# Patient Record
Sex: MALE | Race: WHITE | Employment: UNEMPLOYED | URBAN - METROPOLITAN AREA
[De-identification: names, ages, dates, MRNs, and addresses within clinical notes are randomized per-mention and may not be internally consistent; named-entity substitution may affect disease eponyms.]

---

## 2017-06-21 ENCOUNTER — APPOINTMENT (OUTPATIENT)
Dept: CT IMAGING | Facility: HOSPITAL | Age: 47
DRG: 065 | End: 2017-06-21
Attending: EMERGENCY MEDICINE
Payer: COMMERCIAL

## 2017-06-21 ENCOUNTER — HOSPITAL ENCOUNTER (INPATIENT)
Facility: HOSPITAL | Age: 47
LOS: 2 days | Discharge: HOME | DRG: 065 | End: 2017-06-23
Attending: EMERGENCY MEDICINE | Admitting: HOSPITALIST
Payer: COMMERCIAL

## 2017-06-21 DIAGNOSIS — I63.9 ACUTE CVA (CEREBROVASCULAR ACCIDENT) (HCC): Primary | ICD-10-CM

## 2017-06-21 PROCEDURE — 70450 CT HEAD/BRAIN W/O DYE: CPT | Performed by: EMERGENCY MEDICINE

## 2017-06-21 PROCEDURE — 99223 1ST HOSP IP/OBS HIGH 75: CPT | Performed by: HOSPITALIST

## 2017-06-21 RX ORDER — FAMOTIDINE 20 MG/1
20 TABLET ORAL 2 TIMES DAILY
Status: DISCONTINUED | OUTPATIENT
Start: 2017-06-21 | End: 2017-06-23

## 2017-06-21 RX ORDER — DOCUSATE SODIUM 100 MG/1
100 CAPSULE, LIQUID FILLED ORAL 2 TIMES DAILY
Status: DISCONTINUED | OUTPATIENT
Start: 2017-06-21 | End: 2017-06-23

## 2017-06-21 RX ORDER — FAMOTIDINE 10 MG/ML
20 INJECTION, SOLUTION INTRAVENOUS 2 TIMES DAILY
Status: DISCONTINUED | OUTPATIENT
Start: 2017-06-21 | End: 2017-06-23

## 2017-06-21 RX ORDER — ASPIRIN 81 MG/1
324 TABLET, CHEWABLE ORAL ONCE
Status: COMPLETED | OUTPATIENT
Start: 2017-06-21 | End: 2017-06-21

## 2017-06-21 RX ORDER — SODIUM CHLORIDE 9 MG/ML
INJECTION, SOLUTION INTRAVENOUS CONTINUOUS
Status: ACTIVE | OUTPATIENT
Start: 2017-06-21 | End: 2017-06-22

## 2017-06-21 RX ORDER — PHENYLEPHRINE HCL IN 0.9% NACL 50MG/250ML
PLASTIC BAG, INJECTION (ML) INTRAVENOUS CONTINUOUS PRN
Status: DISCONTINUED | OUTPATIENT
Start: 2017-06-21 | End: 2017-06-23

## 2017-06-21 RX ORDER — TRAZODONE HYDROCHLORIDE 50 MG/1
50 TABLET ORAL NIGHTLY PRN
Status: DISCONTINUED | OUTPATIENT
Start: 2017-06-21 | End: 2017-06-23

## 2017-06-21 RX ORDER — POLYETHYLENE GLYCOL 3350 17 G/17G
17 POWDER, FOR SOLUTION ORAL DAILY PRN
Status: DISCONTINUED | OUTPATIENT
Start: 2017-06-21 | End: 2017-06-23

## 2017-06-21 RX ORDER — ASPIRIN 325 MG
325 TABLET ORAL DAILY
Status: DISCONTINUED | OUTPATIENT
Start: 2017-06-22 | End: 2017-06-23

## 2017-06-21 RX ORDER — LABETALOL HYDROCHLORIDE 5 MG/ML
10 INJECTION, SOLUTION INTRAVENOUS EVERY 10 MIN PRN
Status: DISCONTINUED | OUTPATIENT
Start: 2017-06-21 | End: 2017-06-23

## 2017-06-21 RX ORDER — ACETAMINOPHEN 325 MG/1
650 TABLET ORAL EVERY 4 HOURS PRN
Status: DISCONTINUED | OUTPATIENT
Start: 2017-06-21 | End: 2017-06-23

## 2017-06-21 RX ORDER — ACETAMINOPHEN 650 MG/1
650 SUPPOSITORY RECTAL EVERY 4 HOURS PRN
Status: DISCONTINUED | OUTPATIENT
Start: 2017-06-21 | End: 2017-06-23

## 2017-06-21 RX ORDER — MORPHINE SULFATE 2 MG/ML
1 INJECTION, SOLUTION INTRAMUSCULAR; INTRAVENOUS EVERY 2 HOUR PRN
Status: DISCONTINUED | OUTPATIENT
Start: 2017-06-21 | End: 2017-06-23

## 2017-06-21 RX ORDER — SODIUM PHOSPHATE, DIBASIC AND SODIUM PHOSPHATE, MONOBASIC 7; 19 G/133ML; G/133ML
1 ENEMA RECTAL ONCE AS NEEDED
Status: DISCONTINUED | OUTPATIENT
Start: 2017-06-21 | End: 2017-06-23

## 2017-06-21 RX ORDER — ONDANSETRON 2 MG/ML
4 INJECTION INTRAMUSCULAR; INTRAVENOUS EVERY 6 HOURS PRN
Status: DISCONTINUED | OUTPATIENT
Start: 2017-06-21 | End: 2017-06-23

## 2017-06-21 RX ORDER — SENNOSIDES 8.6 MG
17.2 TABLET ORAL NIGHTLY
Status: DISCONTINUED | OUTPATIENT
Start: 2017-06-21 | End: 2017-06-23

## 2017-06-21 RX ORDER — ATORVASTATIN CALCIUM 80 MG/1
80 TABLET, FILM COATED ORAL NIGHTLY
Status: DISCONTINUED | OUTPATIENT
Start: 2017-06-21 | End: 2017-06-23

## 2017-06-21 RX ORDER — BISACODYL 10 MG
10 SUPPOSITORY, RECTAL RECTAL
Status: DISCONTINUED | OUTPATIENT
Start: 2017-06-21 | End: 2017-06-23

## 2017-06-21 RX ORDER — MORPHINE SULFATE 2 MG/ML
2 INJECTION, SOLUTION INTRAMUSCULAR; INTRAVENOUS EVERY 2 HOUR PRN
Status: DISCONTINUED | OUTPATIENT
Start: 2017-06-21 | End: 2017-06-23

## 2017-06-21 RX ORDER — METOCLOPRAMIDE HYDROCHLORIDE 5 MG/ML
10 INJECTION INTRAMUSCULAR; INTRAVENOUS EVERY 8 HOURS PRN
Status: DISCONTINUED | OUTPATIENT
Start: 2017-06-21 | End: 2017-06-23

## 2017-06-21 RX ORDER — SODIUM CHLORIDE 9 MG/ML
1000 INJECTION, SOLUTION INTRAVENOUS ONCE
Status: COMPLETED | OUTPATIENT
Start: 2017-06-21 | End: 2017-06-21

## 2017-06-21 RX ORDER — ASPIRIN 300 MG
300 SUPPOSITORY, RECTAL RECTAL DAILY
Status: DISCONTINUED | OUTPATIENT
Start: 2017-06-22 | End: 2017-06-23

## 2017-06-22 ENCOUNTER — APPOINTMENT (OUTPATIENT)
Dept: MRI IMAGING | Facility: HOSPITAL | Age: 47
DRG: 065 | End: 2017-06-22
Attending: HOSPITALIST
Payer: COMMERCIAL

## 2017-06-22 ENCOUNTER — APPOINTMENT (OUTPATIENT)
Dept: CV DIAGNOSTICS | Facility: HOSPITAL | Age: 47
DRG: 065 | End: 2017-06-22
Attending: HOSPITALIST
Payer: COMMERCIAL

## 2017-06-22 PROCEDURE — 70553 MRI BRAIN STEM W/O & W/DYE: CPT | Performed by: NURSE PRACTITIONER

## 2017-06-22 PROCEDURE — 70546 MR ANGIOGRAPH HEAD W/O&W/DYE: CPT | Performed by: NURSE PRACTITIONER

## 2017-06-22 PROCEDURE — 93306 TTE W/DOPPLER COMPLETE: CPT | Performed by: HOSPITALIST

## 2017-06-22 PROCEDURE — 99232 SBSQ HOSP IP/OBS MODERATE 35: CPT | Performed by: HOSPITALIST

## 2017-06-22 PROCEDURE — 99223 1ST HOSP IP/OBS HIGH 75: CPT | Performed by: OTHER

## 2017-06-22 PROCEDURE — 70549 MR ANGIOGRAPH NECK W/O&W/DYE: CPT | Performed by: NURSE PRACTITIONER

## 2017-06-22 NOTE — SLP NOTE
Attempted to see for bedside swallow evaluation however pt currently with MD.  Will re-attempt as available and appropriate.

## 2017-06-22 NOTE — PHYSICAL THERAPY NOTE
PHYSICAL THERAPY QUICK EVALUATION - INPATIENT    Room Number: 2048/0412-P  Evaluation Date: 6/22/2017  Presenting Problem: aphasia, right facial droop, acute left frontoparietal infarct  Physician Order: PT Eval and Treat    Problem List  Principal Problem help from another person does the patient currently need. ..   -   Moving to and from a bed to a chair (including a wheelchair)?: None   -   Need to walk in hospital room?: None   -   Climbing 3-5 steps with a railing?: None       AM-PAC Score:  Raw Score: has been evaluated and presents with no skilled Physical Therapy needs at this time. Patient discharged from Physical Therapy services. Please re-order if a new functional limitation presents during this admission.     GOALS  Patient was able to achieve t

## 2017-06-22 NOTE — PROGRESS NOTES
[facility]  Progress Note     Trip Blackman Patient Status:  Inpatient    1970 MRN [id number]   [facility] 8NE-A Attending Katty [facility] Day # 1 PCP None Pcp     Chief Complaint: Aphagia    S: Patient se sodium  100 mg Oral BID   • famoTIDine  20 mg Oral BID    Or   • famoTIDine  20 mg Intravenous BID       ASSESSMENT / PLAN:     1.  Left frontparietal CVA, subacute-no tpa due to 4 days ago onset symptoms-asa/statin-MRI/MRA brain/neck; neuro consult; stroke

## 2017-06-22 NOTE — SLP NOTE
ADULT SWALLOWING EVALUATION    ASSESSMENT & PLAN   ASSESSMENT  Order received for bedside swallow evaluation.   Per chart review:    History of Present Illness: Thu Dolan is a 48year old male with aphasia.  4 days ago had onset of right facial dr Mechanical soft chopped  Diet Recommendations - Liquid:  Thin (small, single sips - straws ok)  Assist with meals as needed  Take meds one at a time with thin liquids; crush or take whole in pureed if any difficulty  Dysphagia tx  Complete video swallow angelica involvement  Comments:     GOALS  Goal #1 The patient will tolerate mechanical soft chopped consistency and thin liquids without overt signs or symptoms of aspiration with 95 % accuracy over 5 session(s).    Goal #2 The patient/family/caregiver will demonst

## 2017-06-22 NOTE — CONSULTS
[facility]  Cardiology History & Physical    Kellijennyshahana Jimenezoctavio Patient Status:  Inpatient    1970 MRN [id number]   [facility] 8NE-A Attending Katty [facility] Day # 1 PCP None Pcp     Date of Admission:  2017 atorvastatin (LIPITOR) tab 80 mg 80 mg Oral Nightly   aspirin 300 MG rectal suppository 300 mg 300 mg Rectal Daily   Or      aspirin tab 325 mg 325 mg Oral Daily   Senna (SENOKOT) tab TABS 17.2 mg 17.2 mg Oral Nightly   docusate sodium (COLACE) cap 100 m organosplenomegally, mass or rebound, BS-present. Extremities: Without clubbing, cyanosis or edema. Peripheral pulses are 2+. Motor weakness on right. Neurologic: right sided facial palsy. Motor weakness 4/5 on affected side. Skin: Warm and dry.

## 2017-06-22 NOTE — ED INITIAL ASSESSMENT (HPI)
Patient is a 49 yo  male with c/o dysarthria and facial droop over the last 4 days. Patients brother states that symptoms were worse yesterday with patient having motor deficits to L arm that have now resolved.  Patient denies any drug or alcohol u

## 2017-06-22 NOTE — H&P
[facility]  History and Physical     Liang Blackman Patient Status:  Emergency    1970 MRN [id number]   Location [facility] Attending Luis Manuel Trevino MD   Hosp Day # 0 PCP None Pcp     Chief Complaint: ap Moves all extremities. Extremities: No edema or cyanosis. Integument: No rashes or lesions. Psychiatric: Appropriate mood and affect.       Diagnostic Data:      Labs:  Recent Labs   Lab  06/21/17   1924   WBC  9.0   HGB  16.7   MCV  91.5   PLT  210.0

## 2017-06-22 NOTE — CONSULTS
[id number] [facility] Initial consultation    Benjamin Blackman Patient Status:  Inpatient    1970 MRN [id number]   [facility] 8NE-A Attending Eddi [facility] Day # 1 PCP None Pcp     REASON FOR EVALUATION: 2017.       Patient on arrival in the ED was noted to have CT head, demonstrating acute infarct in the left MCA territory. He was not a TPA or interventional candidate, as he was well outside the window for any intervention.        Otherwise, patient denie powder packet 17 g 17 g Oral Daily PRN   magnesium hydroxide (MILK OF MAGNESIA) 400 MG/5ML suspension 30 mL 30 mL Oral Daily PRN   bisacodyl (DULCOLAX) rectal suppository 10 mg 10 mg Rectal Daily PRN   FLEET ENEMA (FLEET) 7-19 GM/118ML enema 133 mL 1 enema rhythm.   ABD: Soft, non tender  NEUROLOGICAL:    NIHSS: 5  LOC 1a: 0  LOC 1b:1   LOC 1c: 0  Best Gaze: 0  Visual: 0  Facial Palsy: 2 ( severe upper and lower palsy on the right side, unable to raise eyebrow, asymmetrical smile)  Motor arm a: 0  Motor arm b PROUR Negative 06/21/2017   UROBILINOGEN <2.0 06/21/2017   NITRITE Negative 06/21/2017   LEUUR Negative 06/21/2017     HgA1C   4.8  Lipid Panel  TC   107  Trg   73  HDL   37  LDL   55      IMAGING:    MRI brain 6/22/17:     Mri Brain Mra Head+mra Neck (a includes atheroembolic versus hypercoagulable versus cardioembolic. Patient does have risk factors for stroke, as he is a former smoker for many years but given his young age, will proceed with hypercoagulable workup as well.   In addition, check TTE; if

## 2017-06-22 NOTE — OCCUPATIONAL THERAPY NOTE
OCCUPATIONAL THERAPY EVALUATION - INPATIENT     Room Number: 0511/9789-N  Evaluation Date: 6/22/2017  Type of Evaluation: Initial  Presenting Problem: Acute CVA    Physician Order: IP Consult to Occupational Therapy  Reason for Therapy: ADL/IADL Dysfunctio statement do you understand me? With a Yes and a No response written on it for he to use when anyone enters the room and he needs to communicate.     Behavioral/Emotional/Social: Pt was congenial, appropriate and amenable to evaluation    RANGE OF MOTION AN and middle fingers (digits 2 and 3).   Toileting and don of pants at S level. Bed mobility at S level.  Sit <> stand at S.  Functional ambulation for approx 150 feet  And descend/ascend 11 stairs at S level.  Gross and standardized [facility] [place] [facility] assessments c with modified independent    Functional Transfer Goals  Patient will perform all functional transfers:  independently    UE Exercise Program Goal  Patient will be independent with bilateral AROM HEP (home exercise program).     Additional Goals:  Pt will us

## 2017-06-22 NOTE — ED PROVIDER NOTES
Patient Seen in: [facility] Emergency Department    History   Patient presents with:  Stroke (neurologic)    Stated Complaint: rt facial droop and diff talking    HPI    61-year-old male comes to the emergency department for an evaluation of a four- movements are intact. Tympanic membranes and oropharynx are normal.  Oral mucosa is moist.  There is a right central seventh facial droop. Tongue deviates to the right. Neck: No bruits. No adenopathy or thyromegaly. Lungs are clear to auscultation.   H ------                     CBC W/ DIFFERENTIAL[[id number]]          Abnormal            Final result                 Please view results for these tests on the individual orders.    RAINBOW DRAW BLUE   RAINBOW DRAW GOLD   RAINBOW DRAW LAVENDER   RAINBOW diagnosis)    Disposition:  Admit    Follow-up:  No follow-up provider specified. Medications Prescribed:  There are no discharge medications for this patient.       Present on Admission           ICD-10-CM Noted POA    * (Principal)Acute CVA (cerebrovas

## 2017-06-22 NOTE — CM/SW NOTE
06/22/17 1200   CM/SW Referral Data   Referral Source Nurse   Reason for Referral Protocol order set   Specify order set Stroke   Informant Patient   Patient Info   Patient's Mental Status Alert;Oriented   Patient's [address]   Number of Juli Cummings

## 2017-06-22 NOTE — PROGRESS NOTES
[id number] [facility] Preliminary Note    Bryan Blackman Patient Status:  Inpatient    1970 MRN [id number]   [facility] 8NE-A Attending Andreas [address] Day # 1 PCP None Pcp     REASON FOR EVALUATION:  4 Da Labetalol HCl (TRANDATE) injection 10 mg 10 mg Intravenous Q10 Min PRN   niCARdipine HCl in NaCl (CARDENE) 20 mg/200 ml premix infusion 5-15 mg/hr Intravenous Continuous PRN   phenylephrine in NaCl (KURT-SYNEPHRINE) 50 mg/250 ml premix infusion SOLN 100-2 Inattention: 0    Mental status: This patient is alert and orientated to person, place, time  Speech severe dysarthria, unable to understand most words clearly  Comprehension intact -  Correct naming of items  Attention/concentration: Normal, able to follow 06/21/2017     HgA1C   4.8  Lipid Panel  TC   107  Trg   73  HDL   37  LDL   55      IMAGING:  [facility] 6/21/2017  CONCLUSION:     1.  There is a wedge-shaped focus of low density and loss of gray/white interface with sulcal effacement involving the left frontop

## 2017-06-22 NOTE — PAYOR COMM NOTE
--------------  ADMISSION REVIEW     Payor: Arnold Soliman Hesham Torresbrennon #:  [id number]  Authorization Number: N/A    Admit date: 6/21/2017  7:12 PM       Admitting Physician: Liz Leigh MD  Attending Physician:  Bethany Bernard*  Primary Care Ph Therapeutic Range is approximately 65- 104 seconds. The therapeutic range has been validated against 0.3-0.7 heparin anti-Xa units/mL. CBC WITH DIFFERENTIAL WITH PLATELET    Narrative:      The following orders were created for panel order CBC WITH DIFF 6/22/2017 0747 Given 300 mg Rectal Janet Jefferson RN      aspirin chewable tab 324 mg     Date Action Dose Route User    6/21/2017 2114 Given 324 mg Oral Noel Keita RN      famoTIDine (PEPCID) injection 20 mg     Date Action Dose Route User    6/

## 2017-06-23 ENCOUNTER — HOSPITAL ENCOUNTER (OUTPATIENT)
Dept: CV DIAGNOSTICS | Facility: HOSPITAL | Age: 47
Discharge: HOME | End: 2017-06-23
Attending: INTERNAL MEDICINE
Payer: COMMERCIAL

## 2017-06-23 ENCOUNTER — APPOINTMENT (OUTPATIENT)
Dept: CV DIAGNOSTICS | Facility: HOSPITAL | Age: 47
DRG: 065 | End: 2017-06-23
Attending: Other
Payer: COMMERCIAL

## 2017-06-23 VITALS
RESPIRATION RATE: 16 BRPM | OXYGEN SATURATION: 100 % | SYSTOLIC BLOOD PRESSURE: 121 MMHG | HEART RATE: 59 BPM | TEMPERATURE: 98 F | HEIGHT: 76 IN | BODY MASS INDEX: 23.14 KG/M2 | DIASTOLIC BLOOD PRESSURE: 63 MMHG | WEIGHT: 190 LBS

## 2017-06-23 DIAGNOSIS — I63.9 ACUTE CVA (CEREBROVASCULAR ACCIDENT) (HCC): ICD-10-CM

## 2017-06-23 PROCEDURE — 93271 ECG/MONITORING AND ANALYSIS: CPT | Performed by: INTERNAL MEDICINE

## 2017-06-23 PROCEDURE — 93270 REMOTE 30 DAY ECG REV/REPORT: CPT | Performed by: INTERNAL MEDICINE

## 2017-06-23 PROCEDURE — 99233 SBSQ HOSP IP/OBS HIGH 50: CPT | Performed by: OTHER

## 2017-06-23 PROCEDURE — 93320 DOPPLER ECHO COMPLETE: CPT | Performed by: OTHER

## 2017-06-23 PROCEDURE — 99239 HOSP IP/OBS DSCHRG MGMT >30: CPT | Performed by: HOSPITALIST

## 2017-06-23 PROCEDURE — 93325 DOPPLER ECHO COLOR FLOW MAPG: CPT | Performed by: OTHER

## 2017-06-23 PROCEDURE — 82962 GLUCOSE BLOOD TEST: CPT

## 2017-06-23 PROCEDURE — 93272 ECG/REVIEW INTERPRET ONLY: CPT | Performed by: INTERNAL MEDICINE

## 2017-06-23 PROCEDURE — B24BZZ4 ULTRASONOGRAPHY OF HEART WITH AORTA, TRANSESOPHAGEAL: ICD-10-PCS | Performed by: INTERNAL MEDICINE

## 2017-06-23 RX ORDER — ASPIRIN 325 MG
325 TABLET ORAL DAILY
Qty: 30 TABLET | Refills: 1 | Status: SHIPPED | COMMUNITY
Start: 2017-06-23

## 2017-06-23 RX ORDER — MIDAZOLAM HYDROCHLORIDE 1 MG/ML
INJECTION INTRAMUSCULAR; INTRAVENOUS
Status: COMPLETED
Start: 2017-06-23 | End: 2017-06-23

## 2017-06-23 RX ORDER — ATORVASTATIN CALCIUM 80 MG/1
80 TABLET, FILM COATED ORAL NIGHTLY
Qty: 30 TABLET | Refills: 1 | Status: SHIPPED | OUTPATIENT
Start: 2017-06-23 | End: 2017-07-03

## 2017-06-23 NOTE — SLP NOTE
SPEECH/LANGUAGE/COGNITIVE EVALUATION - INPATIENT    Admission Date: 6/21/2017  Evaluation Date: 06/23/2017    Reason for Referral: Stroke protocol    ASSESSMENT & PLAN   ASSESSMENT  Pt seen for communication screening; utilized the Travis Aphasia Battery identification of errors noted and independent corrections accepted    READING COMPREHENSION  DNT    WRITTEN EXPRESSION  Utilizing Left dominant hand, pt formulated name & address 100%; labeled 3D objects 50% and formulated simple paragraph re: reason for you have any questions please contact Geraldine Tang

## 2017-06-23 NOTE — OCCUPATIONAL THERAPY NOTE
OCCUPATIONAL THERAPY TREATMENT NOTE - INPATIENT     Room Number: 5564/4136-L  Session: 1  Number of Visits to Meet Established Goals: 7    Presenting Problem: Acute CVA    History related to current admission:     Problem List  Principal Problem:    Acute session/findings; All patient questions and concerns addressed; Discussed recommendations with /    ASSESSMENT   Patient is a 48year old male admitted 6/21/2017 for Acute CVA (cerebrovascular accident) [facility]) [I63.9]   CVA (cerebral

## 2017-06-23 NOTE — PROGRESS NOTES
06/23/17 1504   Clinical Encounter Type   Visited With Patient   Routine Visit Introduction   Continue Visiting No   Pt says he is not a person of robyn and did not need our services.

## 2017-06-23 NOTE — PROGRESS NOTES
IV and tele discontinued. Pt. Received discharge instructions and follow-up information as well as paper prescription. Coordinated with cardiographics for 30-day event monitor to be placed just after pt. Is discharged from the floor.  Questions addressed an

## 2017-06-23 NOTE — PROGRESS NOTES
Patient recovered from JUAN, vitals are stable,report given to [facility] Rn,patient was transferred to floor with transport in stable condition.

## 2017-06-23 NOTE — PROCEDURES
Cardiology Transesophageal Echo Note    PRE-PROCEDURE DIAGNOSIS: CVA - assess for cardiac source of thrombus    PROCEDURE: Transesophageal Echocardiogram.    SEDATION:   Topical spray x 1  Versed: 4 mg  Fentanyl: 125 mcg    I personally supervised the intr

## 2017-06-23 NOTE — CM/SW NOTE
Spoke with dr Mya Smith (back line [phone]) confirmed her patient--number to call texted to dr Darrius Guillen, await confirmation of transfer.   Dr Alon Peters has admitting privilages at [facility]  pcp fax  [phone]    Spoke with dr Darrius Guillen who spok

## 2017-06-23 NOTE — CM/SW NOTE
Received call from  that patient is out of network. Patient belongs to [facility]  pcp dr Zuri Roque [phone]. Dr Triston Pereira updated.   To call pcp's office as pt should be at [facility] or [facility] to be in network--message left with office to confirm--

## 2017-06-23 NOTE — PROGRESS NOTES
[facility]  Progress Note     Jovany Blackman Patient Status:  Inpatient    1970 MRN [id number]   [facility]NE-A [facility] Day [facility] 2 PCP None Pcp     Chief Complaint: Aphagia    S: Patient se 100 mg Oral BID   • famoTIDine  20 mg Oral BID    Or   • famoTIDine  20 mg Intravenous BID       ASSESSMENT / PLAN:     1.  Left frontparietal CVA, subacute-no tpa due to 4 days ago onset symptoms-asa/statin-MRI/MRA brain/neck; neuro consult; stroke protoco

## 2017-06-23 NOTE — PROGRESS NOTES
[facility] Neurology Progress Note    Lois Blackman Patient Status:  Inpatient    1970 MRN [id number]   [facility] 8NE-A Attending [facility] Day # 2 PCP None Pcp     Chief Complaint:   Follow up fo noted, no drift, slightly diminished fine motor on the right tone normal.    Coord: Finger-to-nose and Heel-knee-shin is intact, no tremor    Romberg: absent  Gait: deferred    Labs:    Lab Results  Component Value Date   PGLU 95 06/23/2017     HgA1C       above.    Plan:  1.  Ischemic Stroke order set in place   - work up completed, see above results   - TTE was unremarkable, will order JUAN   - Hyper-coag work up in progress (Antithrombin, Protein C, Protein S, Beta 2, Anticardiolipin AB, Lupus anticoagulant

## 2017-06-25 NOTE — DISCHARGE SUMMARY
[facility] HOSPITALIST  DISCHARGE SUMMARY     Aleaberta Kimarcadio Patient Status:  Inpatient    1970 MRN [id number]   [facility] 8NE-A Attending No att. providers found   Hosp Day # 2 PCP None Pcp     Date of Admission: 2017  Date of Hypercoagulable workup was started for negative PT OT recommending home with outpatient physical therapy and speech therapy. Patient neurologically stable and workup negative cardiology did recommend having a LINQ recorder placed.   Patient is out of netwo Physical Exam:    General: No acute distress. Respiratory: Clear to auscultation bilaterally. No wheezes. No rhonchi. Cardiovascular: S1, S2. Regular rate and rhythm. No murmurs, rubs or gallops. Abdomen: Soft, nontender, nondistended.   Positive b

## 2017-06-26 NOTE — PAYOR COMM NOTE
--------------  DISCHARGE REVIEW    Payor: Arnold Soliman Hesham Torresbrennon #:  [id number]  Authorization Number: [id number]    Admit date: 6/21/2017  7:12 PM  Discharge Date: 6/23/2017  3:26 PM     Admitting Physician: Mandy Hollis MD  Attending Phys

## 2017-07-03 ENCOUNTER — OFFICE VISIT (OUTPATIENT)
Dept: NEUROLOGY | Facility: CLINIC | Age: 47
End: 2017-07-03

## 2017-07-03 VITALS
SYSTOLIC BLOOD PRESSURE: 115 MMHG | DIASTOLIC BLOOD PRESSURE: 76 MMHG | RESPIRATION RATE: 16 BRPM | HEART RATE: 70 BPM | HEIGHT: 77 IN | WEIGHT: 183 LBS | BODY MASS INDEX: 21.61 KG/M2

## 2017-07-03 DIAGNOSIS — D68.52 PROTHROMBIN G20210A MUTATION (HCC): ICD-10-CM

## 2017-07-03 DIAGNOSIS — I63.512 ACUTE ISCHEMIC LEFT MIDDLE CEREBRAL ARTERY (MCA) STROKE (HCC): Primary | ICD-10-CM

## 2017-07-03 PROCEDURE — 99214 OFFICE O/P EST MOD 30 MIN: CPT | Performed by: PHYSICIAN ASSISTANT

## 2017-07-03 RX ORDER — ATORVASTATIN CALCIUM 80 MG/1
80 TABLET, FILM COATED ORAL NIGHTLY
Qty: 30 TABLET | Refills: 1 | Status: SHIPPED | OUTPATIENT
Start: 2017-07-03

## 2017-07-03 NOTE — PATIENT INSTRUCTIONS
Refill policies:    • Allow 2-3 business days for refills; controlled substances may take longer.   • Contact your pharmacy at least 5 days prior to running out of medication and have them send an electronic request or submit request through the [facility] have a procedure or additional testing performed. [facility]) will contact your insurance carrier to obtain pre-certification or prior authorization.     Unfortunately, DANNY has seen an increase in denial of payment even though the p

## 2017-07-03 NOTE — PROGRESS NOTES
HPI:    Patient ID: Gabby Rodrigez is a 48year old male. HPI     Patient is a 48year old male here today with his mother for follow-up of left MCA territory stroke.  Patient was seen on 6/22/17 for 4 days of speech difficulties with intact compre HDL Cholesterol      >45 mg/dL 37 (L)   LDL Cholesterol Calc      <130 mg/dL 55   VLDL      5 - 40 mg/dL 15   T.  CHOL/HDL RATIO      <4.97 2.89   NON HDL CHOL      <130 mg/dL 70   STACLOT LUPUS ANTICOAGULANT      Negative Negative   PATHOLOGIST INTERPRET weight 183 lb. PHYSICAL EXAM:   Physical Exam   Constitutional: He is oriented to person, place, and time. He appears well-developed and well-nourished. dysarthria   HENT:   Head: Normocephalic and atraumatic. Able to raise both eyebrows.  Unable t encounter. Meds This Visit:  Signed Prescriptions Disp Refills    atorvastatin 80 MG Oral Tab 30 tablet 1      Sig: Take 1 tablet (80 mg total) by mouth nightly.            Imaging & Referrals:  SPEECH THERAPY - EXTERNAL       OB#[id number]

## 2017-07-03 NOTE — PROGRESS NOTES
HPI:    Patient ID: Emiliano Silverio is a 48year old male. HPI     Patient is here for follow-up of stroke. He has not started speech therapy. Still has numbness if right ipointer finger and and thumb. No weakness. No vision issues. No dizziness.

## 2017-11-13 ENCOUNTER — PRIOR ORIGINAL RECORDS (OUTPATIENT)
Dept: OTHER | Age: 47
End: 2017-11-13

## 2021-05-28 ENCOUNTER — TELEPHONE (OUTPATIENT)
Dept: FAMILY MEDICINE | Age: 51
End: 2021-05-28

## 2021-06-10 ENCOUNTER — APPOINTMENT (OUTPATIENT)
Dept: FAMILY MEDICINE | Age: 51
End: 2021-06-10

## 2021-07-08 ENCOUNTER — OFFICE VISIT (OUTPATIENT)
Dept: FAMILY MEDICINE | Age: 51
End: 2021-07-08

## 2021-07-08 VITALS
TEMPERATURE: 98.8 F | BODY MASS INDEX: 25.09 KG/M2 | DIASTOLIC BLOOD PRESSURE: 70 MMHG | HEART RATE: 66 BPM | SYSTOLIC BLOOD PRESSURE: 110 MMHG | HEIGHT: 76 IN | RESPIRATION RATE: 18 BRPM | WEIGHT: 206 LBS

## 2021-07-08 DIAGNOSIS — Z12.11 SCREENING FOR COLON CANCER: ICD-10-CM

## 2021-07-08 DIAGNOSIS — Z12.5 SCREENING FOR PROSTATE CANCER: ICD-10-CM

## 2021-07-08 DIAGNOSIS — Z86.73 HISTORY OF CARDIOEMBOLIC CEREBROVASCULAR ACCIDENT (CVA): ICD-10-CM

## 2021-07-08 DIAGNOSIS — Z23 NEED FOR TDAP VACCINATION: ICD-10-CM

## 2021-07-08 DIAGNOSIS — Z00.00 MEDICARE ANNUAL WELLNESS VISIT, INITIAL: Primary | ICD-10-CM

## 2021-07-08 DIAGNOSIS — Z23 NEED FOR PROPHYLACTIC VACCINATION WITH TETANUS-DIPHTHERIA (TD): ICD-10-CM

## 2021-07-08 DIAGNOSIS — R47.01 EXPRESSIVE APHASIA: ICD-10-CM

## 2021-07-08 DIAGNOSIS — Z23 NEED FOR SHINGLES VACCINE: ICD-10-CM

## 2021-07-08 PROCEDURE — 90471 IMMUNIZATION ADMIN: CPT

## 2021-07-08 PROCEDURE — 90750 HZV VACC RECOMBINANT IM: CPT

## 2021-07-08 PROCEDURE — G0438 PPPS, INITIAL VISIT: HCPCS | Performed by: FAMILY MEDICINE

## 2021-07-08 PROCEDURE — 90472 IMMUNIZATION ADMIN EACH ADD: CPT | Performed by: FAMILY MEDICINE

## 2021-07-08 PROCEDURE — 90714 TD VACC NO PRESV 7 YRS+ IM: CPT

## 2021-07-08 PROCEDURE — 99204 OFFICE O/P NEW MOD 45 MIN: CPT | Performed by: FAMILY MEDICINE

## 2021-07-08 RX ORDER — ATORVASTATIN CALCIUM 40 MG/1
40 TABLET, FILM COATED ORAL DAILY
Qty: 90 TABLET | Refills: 3 | Status: SHIPPED | OUTPATIENT
Start: 2021-07-08 | End: 2021-08-26 | Stop reason: ALTCHOICE

## 2021-07-08 RX ORDER — ASPIRIN 81 MG/1
81 TABLET ORAL
COMMUNITY

## 2021-07-08 ASSESSMENT — PATIENT HEALTH QUESTIONNAIRE - PHQ9
SUM OF ALL RESPONSES TO PHQ9 QUESTIONS 1 AND 2: 0
SUM OF ALL RESPONSES TO PHQ9 QUESTIONS 1 AND 2: 0
1. LITTLE INTEREST OR PLEASURE IN DOING THINGS: NOT AT ALL
CLINICAL INTERPRETATION OF PHQ2 SCORE: NO FURTHER SCREENING NEEDED
CLINICAL INTERPRETATION OF PHQ9 SCORE: NO FURTHER SCREENING NEEDED
2. FEELING DOWN, DEPRESSED OR HOPELESS: NOT AT ALL

## 2021-07-08 ASSESSMENT — VISUAL ACUITY
OS_CC: 20/40
OD_CC: 20/40

## 2021-07-21 ENCOUNTER — LAB SERVICES (OUTPATIENT)
Dept: LAB | Age: 51
End: 2021-07-21

## 2021-07-21 DIAGNOSIS — Z86.73 HISTORY OF CARDIOEMBOLIC CEREBROVASCULAR ACCIDENT (CVA): ICD-10-CM

## 2021-07-21 DIAGNOSIS — Z12.5 SCREENING FOR PROSTATE CANCER: ICD-10-CM

## 2021-07-21 LAB
ALBUMIN SERPL-MCNC: 5.2 G/DL (ref 3.6–5.1)
ALP SERPL-CCNC: 59 U/L (ref 45–115)
ALT SERPL W/O P-5'-P-CCNC: 48 U/L (ref 5–49)
AST SERPL-CCNC: 73 U/L (ref 14–43)
BILIRUB SERPL-MCNC: 0.8 MG/DL (ref 0–1.3)
BUN SERPL-MCNC: 13 MG/DL (ref 6–27)
CALCIUM SERPL-MCNC: 10 MG/DL (ref 8.6–10.6)
CHLORIDE SERPL-SCNC: 106 MMOL/L (ref 96–107)
CHOLEST SERPL-MCNC: 220 MG/DL (ref 140–200)
CO2 SERPL-SCNC: 23 MMOL/L (ref 22–32)
CREAT SERPL-MCNC: 0.7 MG/DL (ref 0.6–1.6)
GFR SERPL CREATININE-BSD FRML MDRD: >60 ML/MIN/{1.73M2}
GFR SERPL CREATININE-BSD FRML MDRD: >60 ML/MIN/{1.73M2}
GLUCOSE P FAST SERPL-MCNC: 109 MG/DL (ref 60–100)
HDLC SERPL-MCNC: 85 MG/DL
LDLC SERPL CALC-MCNC: 116 MG/DL (ref 30–100)
POTASSIUM SERPL-SCNC: 4.1 MMOL/L (ref 3.5–5.3)
PROT SERPL-MCNC: 8.5 G/DL (ref 6.4–8.5)
PSA SERPL-MCNC: 0.97 NG/ML (ref 0–3)
SODIUM SERPL-SCNC: 145 MMOL/L (ref 136–146)
TRIGL SERPL-MCNC: 94 MG/DL (ref 0–200)

## 2021-07-21 PROCEDURE — 80053 COMPREHEN METABOLIC PANEL: CPT | Performed by: FAMILY MEDICINE

## 2021-07-21 PROCEDURE — 36415 COLL VENOUS BLD VENIPUNCTURE: CPT | Performed by: FAMILY MEDICINE

## 2021-07-21 PROCEDURE — 80061 LIPID PANEL: CPT | Performed by: FAMILY MEDICINE

## 2021-07-21 PROCEDURE — G0103 PSA SCREENING: HCPCS | Performed by: FAMILY MEDICINE

## 2021-07-26 ENCOUNTER — TELEPHONE (OUTPATIENT)
Dept: GASTROENTEROLOGY | Age: 51
End: 2021-07-26

## 2021-08-03 DIAGNOSIS — R73.01 ELEVATED FASTING GLUCOSE: Primary | ICD-10-CM

## 2021-08-26 ENCOUNTER — TELEPHONE (OUTPATIENT)
Dept: FAMILY MEDICINE | Age: 51
End: 2021-08-26

## 2021-08-26 RX ORDER — PRAVASTATIN SODIUM 10 MG
10 TABLET ORAL DAILY
Qty: 90 TABLET | Refills: 3 | Status: SHIPPED | OUTPATIENT
Start: 2021-08-26

## 2022-10-19 ENCOUNTER — TELEPHONE (OUTPATIENT)
Dept: OTHER | Age: 52
End: 2022-10-19

## 2022-11-29 DIAGNOSIS — Z12.11 COLON CANCER SCREENING: ICD-10-CM

## 2022-12-16 ENCOUNTER — LAB SERVICES (OUTPATIENT)
Dept: LAB | Age: 52
End: 2022-12-16

## 2022-12-16 ENCOUNTER — OFFICE VISIT (OUTPATIENT)
Dept: FAMILY MEDICINE | Age: 52
End: 2022-12-16

## 2022-12-16 VITALS
OXYGEN SATURATION: 98 % | HEIGHT: 76 IN | BODY MASS INDEX: 24.11 KG/M2 | HEART RATE: 97 BPM | DIASTOLIC BLOOD PRESSURE: 60 MMHG | SYSTOLIC BLOOD PRESSURE: 100 MMHG | WEIGHT: 198 LBS | TEMPERATURE: 98 F | RESPIRATION RATE: 20 BRPM

## 2022-12-16 DIAGNOSIS — Z12.5 SCREENING PSA (PROSTATE SPECIFIC ANTIGEN): ICD-10-CM

## 2022-12-16 DIAGNOSIS — E78.5 HYPERLIPIDEMIA LDL GOAL <70: ICD-10-CM

## 2022-12-16 DIAGNOSIS — R73.01 ELEVATED FASTING GLUCOSE: ICD-10-CM

## 2022-12-16 DIAGNOSIS — Z00.00 MEDICARE ANNUAL WELLNESS VISIT, SUBSEQUENT: Primary | ICD-10-CM

## 2022-12-16 DIAGNOSIS — Z86.73 HISTORY OF CARDIOEMBOLIC CEREBROVASCULAR ACCIDENT (CVA): ICD-10-CM

## 2022-12-16 DIAGNOSIS — Z23 NEED FOR SHINGLES VACCINE: ICD-10-CM

## 2022-12-16 DIAGNOSIS — Z23 NEED FOR INFLUENZA VACCINATION: ICD-10-CM

## 2022-12-16 DIAGNOSIS — R47.01 EXPRESSIVE APHASIA: ICD-10-CM

## 2022-12-16 PROCEDURE — 99214 OFFICE O/P EST MOD 30 MIN: CPT | Performed by: FAMILY MEDICINE

## 2022-12-16 PROCEDURE — 90686 IIV4 VACC NO PRSV 0.5 ML IM: CPT | Performed by: FAMILY MEDICINE

## 2022-12-16 PROCEDURE — 80053 COMPREHEN METABOLIC PANEL: CPT | Performed by: INTERNAL MEDICINE

## 2022-12-16 PROCEDURE — 36415 COLL VENOUS BLD VENIPUNCTURE: CPT | Performed by: FAMILY MEDICINE

## 2022-12-16 PROCEDURE — 90750 HZV VACC RECOMBINANT IM: CPT | Performed by: FAMILY MEDICINE

## 2022-12-16 PROCEDURE — G0008 ADMIN INFLUENZA VIRUS VAC: HCPCS | Performed by: FAMILY MEDICINE

## 2022-12-16 PROCEDURE — G0439 PPPS, SUBSEQ VISIT: HCPCS | Performed by: FAMILY MEDICINE

## 2022-12-16 PROCEDURE — G0103 PSA SCREENING: HCPCS | Performed by: INTERNAL MEDICINE

## 2022-12-16 PROCEDURE — 90472 IMMUNIZATION ADMIN EACH ADD: CPT | Performed by: FAMILY MEDICINE

## 2022-12-16 PROCEDURE — 83036 HEMOGLOBIN GLYCOSYLATED A1C: CPT | Performed by: INTERNAL MEDICINE

## 2022-12-16 PROCEDURE — 80061 LIPID PANEL: CPT | Performed by: INTERNAL MEDICINE

## 2022-12-16 ASSESSMENT — PATIENT HEALTH QUESTIONNAIRE - PHQ9
1. LITTLE INTEREST OR PLEASURE IN DOING THINGS: NOT AT ALL
SUM OF ALL RESPONSES TO PHQ9 QUESTIONS 1 AND 2: 0
SUM OF ALL RESPONSES TO PHQ9 QUESTIONS 1 AND 2: 0
2. FEELING DOWN, DEPRESSED OR HOPELESS: NOT AT ALL
CLINICAL INTERPRETATION OF PHQ2 SCORE: NO FURTHER SCREENING NEEDED

## 2022-12-17 LAB
ALBUMIN SERPL-MCNC: 4 G/DL (ref 3.6–5.1)
ALBUMIN/GLOB SERPL: 1.1 {RATIO} (ref 1–2.4)
ALP SERPL-CCNC: 46 UNITS/L (ref 45–117)
ALT SERPL-CCNC: 44 UNITS/L
ANION GAP SERPL CALC-SCNC: 17 MMOL/L (ref 7–19)
AST SERPL-CCNC: 62 UNITS/L
BILIRUB SERPL-MCNC: 1 MG/DL (ref 0.2–1)
BUN SERPL-MCNC: 11 MG/DL (ref 6–20)
BUN/CREAT SERPL: 15 (ref 7–25)
CALCIUM SERPL-MCNC: 8.6 MG/DL (ref 8.4–10.2)
CHLORIDE SERPL-SCNC: 103 MMOL/L (ref 97–110)
CHOLEST SERPL-MCNC: 178 MG/DL
CHOLEST/HDLC SERPL: 2.3 {RATIO}
CO2 SERPL-SCNC: 26 MMOL/L (ref 21–32)
CREAT SERPL-MCNC: 0.73 MG/DL (ref 0.67–1.17)
FASTING DURATION TIME PATIENT: ABNORMAL H
FASTING DURATION TIME PATIENT: NORMAL H
GFR SERPLBLD BASED ON 1.73 SQ M-ARVRAT: >90 ML/MIN
GLOBULIN SER-MCNC: 3.6 G/DL (ref 2–4)
GLUCOSE SERPL-MCNC: 76 MG/DL (ref 70–99)
HBA1C MFR BLD: 4.8 % (ref 4.5–5.6)
HDLC SERPL-MCNC: 78 MG/DL
LDLC SERPL CALC-MCNC: 87 MG/DL
NONHDLC SERPL-MCNC: 100 MG/DL
POTASSIUM SERPL-SCNC: 4.3 MMOL/L (ref 3.4–5.1)
PROT SERPL-MCNC: 7.6 G/DL (ref 6.4–8.2)
PSA SERPL-MCNC: 0.97 NG/ML
SODIUM SERPL-SCNC: 142 MMOL/L (ref 135–145)
TRIGL SERPL-MCNC: 63 MG/DL

## 2023-01-09 ENCOUNTER — LAB SERVICES (OUTPATIENT)
Dept: LAB | Age: 53
End: 2023-01-09

## 2023-01-09 DIAGNOSIS — Z12.11 COLON CANCER SCREENING: Primary | ICD-10-CM

## 2023-01-09 PROCEDURE — 82274 ASSAY TEST FOR BLOOD FECAL: CPT | Performed by: INTERNAL MEDICINE

## 2023-01-19 LAB — HEMOCCULT STL QL: NEGATIVE

## 2023-11-18 ENCOUNTER — E-ADVICE (OUTPATIENT)
Dept: OTHER | Age: 53
End: 2023-11-18

## 2023-12-18 ENCOUNTER — APPOINTMENT (OUTPATIENT)
Dept: FAMILY MEDICINE | Age: 53
End: 2023-12-18

## 2023-12-18 ENCOUNTER — LAB SERVICES (OUTPATIENT)
Dept: LAB | Age: 53
End: 2023-12-18

## 2023-12-18 VITALS
SYSTOLIC BLOOD PRESSURE: 124 MMHG | HEART RATE: 79 BPM | BODY MASS INDEX: 23.87 KG/M2 | WEIGHT: 196 LBS | DIASTOLIC BLOOD PRESSURE: 78 MMHG | OXYGEN SATURATION: 98 % | TEMPERATURE: 97.3 F | HEIGHT: 76 IN

## 2023-12-18 DIAGNOSIS — Z13.31 DEPRESSION SCREENING: ICD-10-CM

## 2023-12-18 DIAGNOSIS — Z00.00 MEDICARE ANNUAL WELLNESS VISIT, SUBSEQUENT: ICD-10-CM

## 2023-12-18 DIAGNOSIS — E78.5 HYPERLIPIDEMIA LDL GOAL <70: ICD-10-CM

## 2023-12-18 DIAGNOSIS — R47.01 EXPRESSIVE APHASIA: Primary | ICD-10-CM

## 2023-12-18 DIAGNOSIS — Z86.73 HISTORY OF CARDIOEMBOLIC CEREBROVASCULAR ACCIDENT (CVA): ICD-10-CM

## 2023-12-18 DIAGNOSIS — Z00.00 PREVENTATIVE HEALTH CARE: ICD-10-CM

## 2023-12-18 DIAGNOSIS — H00.011 HORDEOLUM EXTERNUM OF RIGHT UPPER EYELID: ICD-10-CM

## 2023-12-18 DIAGNOSIS — Z12.11 COLON CANCER SCREENING: ICD-10-CM

## 2023-12-18 DIAGNOSIS — R73.01 ELEVATED FASTING GLUCOSE: ICD-10-CM

## 2023-12-18 PROBLEM — I69.359 HISTORY OF HEMORRHAGIC STROKE WITH RESIDUAL HEMIPLEGIA (CMD): Status: RESOLVED | Noted: 2023-12-18 | Resolved: 2023-12-18

## 2023-12-18 PROBLEM — I69.359 HISTORY OF HEMORRHAGIC STROKE WITH RESIDUAL HEMIPLEGIA (CMD): Status: ACTIVE | Noted: 2023-12-18

## 2023-12-18 LAB
ALBUMIN SERPL-MCNC: 4 G/DL (ref 3.6–5.1)
ALBUMIN/GLOB SERPL: 1.1 {RATIO} (ref 1–2.4)
ALP SERPL-CCNC: 52 UNITS/L (ref 45–117)
ALT SERPL-CCNC: 24 UNITS/L
ANION GAP SERPL CALC-SCNC: 16 MMOL/L (ref 7–19)
AST SERPL-CCNC: 23 UNITS/L
BASOPHILS # BLD: 0 K/MCL (ref 0–0.3)
BASOPHILS NFR BLD: 1 %
BILIRUB SERPL-MCNC: 0.8 MG/DL (ref 0.2–1)
BUN SERPL-MCNC: 12 MG/DL (ref 6–20)
BUN/CREAT SERPL: 16 (ref 7–25)
CALCIUM SERPL-MCNC: 8.5 MG/DL (ref 8.4–10.2)
CHLORIDE SERPL-SCNC: 103 MMOL/L (ref 97–110)
CHOLEST SERPL-MCNC: 205 MG/DL
CHOLEST/HDLC SERPL: 2.3 {RATIO}
CO2 SERPL-SCNC: 28 MMOL/L (ref 21–32)
CREAT SERPL-MCNC: 0.73 MG/DL (ref 0.67–1.17)
DEPRECATED RDW RBC: 50.3 FL (ref 39–50)
EGFRCR SERPLBLD CKD-EPI 2021: >90 ML/MIN/{1.73_M2}
EOSINOPHIL # BLD: 0.1 K/MCL (ref 0–0.5)
EOSINOPHIL NFR BLD: 1 %
ERYTHROCYTE [DISTWIDTH] IN BLOOD: 13.3 % (ref 11–15)
FASTING DURATION TIME PATIENT: NORMAL H
GLOBULIN SER-MCNC: 3.8 G/DL (ref 2–4)
GLUCOSE SERPL-MCNC: 97 MG/DL (ref 70–99)
HBA1C MFR BLD: 4.4 % (ref 4.5–5.6)
HCT VFR BLD CALC: 45.2 % (ref 39–51)
HDLC SERPL-MCNC: 88 MG/DL
HGB BLD-MCNC: 14.9 G/DL (ref 13–17)
IMM GRANULOCYTES # BLD AUTO: 0.1 K/MCL (ref 0–0.2)
IMM GRANULOCYTES # BLD: 1 %
LDLC SERPL CALC-MCNC: 109 MG/DL
LYMPHOCYTES # BLD: 1.2 K/MCL (ref 1–4)
LYMPHOCYTES NFR BLD: 17 %
MCH RBC QN AUTO: 33.6 PG (ref 26–34)
MCHC RBC AUTO-ENTMCNC: 33 G/DL (ref 32–36.5)
MCV RBC AUTO: 102 FL (ref 78–100)
MONOCYTES # BLD: 0.9 K/MCL (ref 0.3–0.9)
MONOCYTES NFR BLD: 12 %
NEUTROPHILS # BLD: 5 K/MCL (ref 1.8–7.7)
NEUTROPHILS NFR BLD: 68 %
NONHDLC SERPL-MCNC: 117 MG/DL
NRBC BLD MANUAL-RTO: 0 /100 WBC
PLATELET # BLD AUTO: 171 K/MCL (ref 140–450)
POTASSIUM SERPL-SCNC: 4.3 MMOL/L (ref 3.4–5.1)
PROT SERPL-MCNC: 7.8 G/DL (ref 6.4–8.2)
RBC # BLD: 4.43 MIL/MCL (ref 4.5–5.9)
SODIUM SERPL-SCNC: 143 MMOL/L (ref 135–145)
TRIGL SERPL-MCNC: 38 MG/DL
WBC # BLD: 7.3 K/MCL (ref 4.2–11)

## 2023-12-18 PROCEDURE — 83036 HEMOGLOBIN GLYCOSYLATED A1C: CPT | Performed by: INTERNAL MEDICINE

## 2023-12-18 PROCEDURE — 80061 LIPID PANEL: CPT | Performed by: INTERNAL MEDICINE

## 2023-12-18 PROCEDURE — 99213 OFFICE O/P EST LOW 20 MIN: CPT | Performed by: NURSE PRACTITIONER

## 2023-12-18 PROCEDURE — 85025 COMPLETE CBC W/AUTO DIFF WBC: CPT | Performed by: INTERNAL MEDICINE

## 2023-12-18 PROCEDURE — G0439 PPPS, SUBSEQ VISIT: HCPCS | Performed by: NURSE PRACTITIONER

## 2023-12-18 PROCEDURE — 80053 COMPREHEN METABOLIC PANEL: CPT | Performed by: INTERNAL MEDICINE

## 2023-12-18 PROCEDURE — G0103 PSA SCREENING: HCPCS | Performed by: CLINICAL MEDICAL LABORATORY

## 2023-12-18 PROCEDURE — 36415 COLL VENOUS BLD VENIPUNCTURE: CPT | Performed by: NURSE PRACTITIONER

## 2023-12-18 RX ORDER — ERYTHROMYCIN 5 MG/G
0.5 OINTMENT OPHTHALMIC 4 TIMES DAILY
Qty: 3.5 G | Refills: 0 | Status: SHIPPED | OUTPATIENT
Start: 2023-12-18 | End: 2023-12-25

## 2023-12-18 RX ORDER — ATORVASTATIN CALCIUM 10 MG/1
TABLET, FILM COATED ORAL
Qty: 90 TABLET | Refills: 3 | Status: SHIPPED | OUTPATIENT
Start: 2023-12-18

## 2023-12-18 ASSESSMENT — ENCOUNTER SYMPTOMS
SINUS PAIN: 0
CHEST TIGHTNESS: 0
HEADACHES: 0
PHOTOPHOBIA: 0
BACK PAIN: 0
VOMITING: 0
SHORTNESS OF BREATH: 0
FEVER: 0
APPETITE CHANGE: 0
NUMBNESS: 0
COUGH: 0
FACIAL SWELLING: 0
BLOOD IN STOOL: 0
DIZZINESS: 0
RHINORRHEA: 0
ADENOPATHY: 0
NERVOUS/ANXIOUS: 0
ABDOMINAL PAIN: 0
SINUS PRESSURE: 0
UNEXPECTED WEIGHT CHANGE: 0
EYE DISCHARGE: 0
EYE PAIN: 0
ACTIVITY CHANGE: 0
SORE THROAT: 0
FATIGUE: 0
WHEEZING: 0
BRUISES/BLEEDS EASILY: 0
SLEEP DISTURBANCE: 0
EYE ITCHING: 0
EYE REDNESS: 0
WEAKNESS: 0
CONSTIPATION: 0
DIARRHEA: 0

## 2023-12-18 ASSESSMENT — MINI COG
PATIENT WAS GIVEN REPEAT BACK WORDS FROM VERSION: 1 - BANANA SUNRISE CHAIR
PATIENT ABLE TO FILL IN THE CLOCK FACE WITH 10 MINUTES PAST 11 O'CLOCK?: YES, CLOCK IS CORRECT
PATIENT ABLE TO REPEAT THE 3 WORDS GIVEN PREVIOUSLY?: WAS ABLE TO REPEAT BACK 3 WORDS CORRECTLY
TOTAL SCORE: 5

## 2023-12-18 ASSESSMENT — PATIENT HEALTH QUESTIONNAIRE - PHQ9
1. LITTLE INTEREST OR PLEASURE IN DOING THINGS: NOT AT ALL
SUM OF ALL RESPONSES TO PHQ9 QUESTIONS 1 AND 2: 0
SUM OF ALL RESPONSES TO PHQ9 QUESTIONS 1 AND 2: 0
IS THE PATIENT ABLE TO COGNITIVELY COMPLETE THE PHQ9: NO
2. FEELING DOWN, DEPRESSED OR HOPELESS: NOT AT ALL
CLINICAL INTERPRETATION OF PHQ2 SCORE: NO FURTHER SCREENING NEEDED

## 2023-12-19 ENCOUNTER — TELEPHONE (OUTPATIENT)
Dept: FAMILY MEDICINE | Age: 53
End: 2023-12-19

## 2023-12-19 DIAGNOSIS — R97.20 ELEVATED PSA: Primary | ICD-10-CM

## 2023-12-19 LAB — PSA SERPL-MCNC: 5.49 NG/ML

## 2023-12-20 DIAGNOSIS — R97.20 ELEVATED PSA: Primary | ICD-10-CM

## 2023-12-29 ENCOUNTER — APPOINTMENT (OUTPATIENT)
Dept: OPHTHALMOLOGY | Age: 53
End: 2023-12-29

## 2023-12-29 DIAGNOSIS — Z98.890 HX OF LASIK: ICD-10-CM

## 2023-12-29 DIAGNOSIS — E11.9 TYPE 2 DIABETES MELLITUS WITHOUT RETINOPATHY (CMD): Primary | ICD-10-CM

## 2023-12-29 PROCEDURE — 92004 COMPRE OPH EXAM NEW PT 1/>: CPT | Performed by: OPHTHALMOLOGY

## 2023-12-29 ASSESSMENT — VISUAL ACUITY
OS_SC: 20/25
OS_SC: J3
OD_SC: 20/40
OD_SC+: +2
OD_PH_SC: 20/20
OD_SC: J3

## 2023-12-29 ASSESSMENT — TONOMETRY
IOP_METHOD: APPLANATION
OS_IOP_MMHG: 14
OD_IOP_MMHG: 16

## 2023-12-29 ASSESSMENT — SLIT LAMP EXAM - LIDS
COMMENTS: NORMAL
COMMENTS: NORMAL

## 2023-12-29 ASSESSMENT — EXTERNAL EXAM - RIGHT EYE: OD_EXAM: NORMAL

## 2023-12-29 ASSESSMENT — EXTERNAL EXAM - LEFT EYE: OS_EXAM: NORMAL

## 2023-12-29 ASSESSMENT — CONF VISUAL FIELD
OS_INFERIOR_NASAL_RESTRICTION: 0
OS_SUPERIOR_NASAL_RESTRICTION: 0
OD_INFERIOR_TEMPORAL_RESTRICTION: 0
OD_NORMAL: 1
OD_SUPERIOR_NASAL_RESTRICTION: 0
OD_INFERIOR_NASAL_RESTRICTION: 0
OS_INFERIOR_TEMPORAL_RESTRICTION: 0
OS_NORMAL: 1
OD_SUPERIOR_TEMPORAL_RESTRICTION: 0
OS_SUPERIOR_TEMPORAL_RESTRICTION: 0

## 2024-01-05 ENCOUNTER — TELEPHONE (OUTPATIENT)
Dept: FAMILY MEDICINE | Age: 54
End: 2024-01-05

## 2024-01-06 ENCOUNTER — E-ADVICE (OUTPATIENT)
Dept: FAMILY MEDICINE | Age: 54
End: 2024-01-06

## 2024-12-21 ENCOUNTER — OFF PREMISE (OUTPATIENT)
Dept: HEALTH INFORMATION MANAGEMENT | Facility: OTHER | Age: 54
End: 2024-12-21

## 2024-12-22 ENCOUNTER — OFF PREMISE (OUTPATIENT)
Dept: HEALTH INFORMATION MANAGEMENT | Facility: OTHER | Age: 54
End: 2024-12-22

## 2025-01-07 ENCOUNTER — OFF PREMISE (OUTPATIENT)
Dept: HEALTH INFORMATION MANAGEMENT | Facility: OTHER | Age: 55
End: 2025-01-07

## 2025-01-08 ENCOUNTER — ADMINISTRATIVE DOCUMENTATION (OUTPATIENT)
Dept: POST ACUTE CARE | Age: 55
End: 2025-01-08

## 2025-01-08 ENCOUNTER — TELEPHONE (OUTPATIENT)
Dept: FAMILY MEDICINE | Age: 55
End: 2025-01-08

## 2025-01-08 RX ORDER — OMEPRAZOLE 40 MG/1
40 CAPSULE, DELAYED RELEASE ORAL DAILY
COMMUNITY

## 2025-01-08 RX ORDER — FOLIC ACID 1 MG/1
1 TABLET ORAL DAILY
COMMUNITY

## 2025-01-08 RX ORDER — NITROGLYCERIN 0.4 MG/1
0.4 TABLET SUBLINGUAL EVERY 5 MIN PRN
COMMUNITY

## 2025-01-08 RX ORDER — CLOPIDOGREL BISULFATE 75 MG/1
75 TABLET ORAL DAILY
COMMUNITY

## 2025-01-08 RX ORDER — ENOXAPARIN SODIUM 100 MG/ML
40 INJECTION SUBCUTANEOUS AT BEDTIME
COMMUNITY

## 2025-01-08 RX ORDER — LISINOPRIL 5 MG/1
5 TABLET ORAL DAILY
COMMUNITY

## 2025-01-08 RX ORDER — AMLODIPINE BESYLATE 5 MG/1
5 TABLET ORAL DAILY
COMMUNITY

## 2025-01-08 RX ORDER — TIZANIDINE 2 MG/1
2 TABLET ORAL EVERY 8 HOURS PRN
COMMUNITY

## 2025-01-08 RX ORDER — SCOLOPAMINE TRANSDERMAL SYSTEM 1 MG/1
1 PATCH, EXTENDED RELEASE TRANSDERMAL
COMMUNITY

## 2025-01-09 ENCOUNTER — NURSING HOME VISIT (OUTPATIENT)
Dept: POST ACUTE CARE | Age: 55
End: 2025-01-09

## 2025-01-09 VITALS
DIASTOLIC BLOOD PRESSURE: 80 MMHG | HEART RATE: 99 BPM | TEMPERATURE: 97.2 F | SYSTOLIC BLOOD PRESSURE: 130 MMHG | RESPIRATION RATE: 18 BRPM | OXYGEN SATURATION: 96 %

## 2025-01-09 DIAGNOSIS — U07.1 COVID-19: ICD-10-CM

## 2025-01-09 DIAGNOSIS — I69.391 DYSPHAGIA AS LATE EFFECT OF STROKE: ICD-10-CM

## 2025-01-09 DIAGNOSIS — I10 BENIGN ESSENTIAL HTN: ICD-10-CM

## 2025-01-09 DIAGNOSIS — G81.94 LEFT HEMIPARESIS  (CMD): ICD-10-CM

## 2025-01-09 DIAGNOSIS — K21.9 GASTROESOPHAGEAL REFLUX DISEASE WITHOUT ESOPHAGITIS: ICD-10-CM

## 2025-01-09 DIAGNOSIS — R47.01 EXPRESSIVE APHASIA: ICD-10-CM

## 2025-01-09 DIAGNOSIS — Z71.89 ACP (ADVANCE CARE PLANNING): ICD-10-CM

## 2025-01-09 DIAGNOSIS — I63.512 LEFT ACUTE ARTERIAL ISCHEMIC STROKE, MCA (MIDDLE CEREBRAL ARTERY)  (CMD): Primary | ICD-10-CM

## 2025-01-09 DIAGNOSIS — R53.81 DEBILITY: ICD-10-CM

## 2025-01-09 DIAGNOSIS — D68.52 PROTHROMBIN GENE MUTATION  (CMD): ICD-10-CM

## 2025-01-09 PROBLEM — I63.319: Status: ACTIVE | Noted: 2025-01-09

## 2025-01-09 ASSESSMENT — ENCOUNTER SYMPTOMS
SINUS PRESSURE: 0
ROS GI COMMENTS: G TUBE
POLYDIPSIA: 0
SINUS PAIN: 0
HEADACHES: 0
BACK PAIN: 0
FEVER: 0
POLYPHAGIA: 0
AGITATION: 0
ACTIVITY CHANGE: 1
SORE THROAT: 0
CONSTIPATION: 0
COLOR CHANGE: 0
SHORTNESS OF BREATH: 0
COUGH: 0
NAUSEA: 0
PHOTOPHOBIA: 0
WEAKNESS: 1
WHEEZING: 0
VOMITING: 0
LIGHT-HEADEDNESS: 0
CHILLS: 0
NERVOUS/ANXIOUS: 0
RHINORRHEA: 0
EYE REDNESS: 0
DIARRHEA: 0
DIZZINESS: 0
TROUBLE SWALLOWING: 1
SLEEP DISTURBANCE: 0

## 2025-01-10 ENCOUNTER — NURSING HOME VISIT (OUTPATIENT)
Dept: POST ACUTE CARE | Age: 55
End: 2025-01-10

## 2025-01-10 VITALS
HEIGHT: 76 IN | OXYGEN SATURATION: 95 % | TEMPERATURE: 98.4 F | RESPIRATION RATE: 18 BRPM | WEIGHT: 203 LBS | SYSTOLIC BLOOD PRESSURE: 120 MMHG | BODY MASS INDEX: 24.72 KG/M2 | HEART RATE: 112 BPM | DIASTOLIC BLOOD PRESSURE: 80 MMHG

## 2025-01-10 DIAGNOSIS — G81.94 LEFT HEMIPARESIS  (CMD): ICD-10-CM

## 2025-01-10 DIAGNOSIS — K21.9 GASTROESOPHAGEAL REFLUX DISEASE WITHOUT ESOPHAGITIS: ICD-10-CM

## 2025-01-10 DIAGNOSIS — R53.81 DEBILITY: Primary | ICD-10-CM

## 2025-01-10 DIAGNOSIS — R68.89 EXCESSIVE ORAL SECRETIONS: ICD-10-CM

## 2025-01-10 DIAGNOSIS — Z86.73 HISTORY OF CARDIOEMBOLIC CEREBROVASCULAR ACCIDENT (CVA): ICD-10-CM

## 2025-01-10 DIAGNOSIS — I63.512 LEFT ACUTE ARTERIAL ISCHEMIC STROKE, MCA (MIDDLE CEREBRAL ARTERY)  (CMD): ICD-10-CM

## 2025-01-10 DIAGNOSIS — R47.01 EXPRESSIVE APHASIA: ICD-10-CM

## 2025-01-10 DIAGNOSIS — U07.1 COVID-19: ICD-10-CM

## 2025-01-10 DIAGNOSIS — I69.391 DYSPHAGIA AS LATE EFFECT OF STROKE: ICD-10-CM

## 2025-01-10 DIAGNOSIS — E78.5 HYPERLIPIDEMIA LDL GOAL <70: ICD-10-CM

## 2025-01-10 RX ORDER — IPRATROPIUM BROMIDE AND ALBUTEROL SULFATE 2.5; .5 MG/3ML; MG/3ML
3 SOLUTION RESPIRATORY (INHALATION) EVERY 8 HOURS
COMMUNITY
End: 2025-01-13

## 2025-01-10 ASSESSMENT — PAIN SCALES - GENERAL: PAINLEVEL: 0

## 2025-01-13 ENCOUNTER — NURSING HOME VISIT (OUTPATIENT)
Dept: POST ACUTE CARE | Age: 55
End: 2025-01-13

## 2025-01-13 VITALS
WEIGHT: 203.5 LBS | SYSTOLIC BLOOD PRESSURE: 140 MMHG | OXYGEN SATURATION: 96 % | HEIGHT: 76 IN | DIASTOLIC BLOOD PRESSURE: 93 MMHG | TEMPERATURE: 98.4 F | RESPIRATION RATE: 17 BRPM | HEART RATE: 99 BPM | BODY MASS INDEX: 24.78 KG/M2

## 2025-01-13 DIAGNOSIS — I69.391 DYSPHAGIA AS LATE EFFECT OF STROKE: ICD-10-CM

## 2025-01-13 DIAGNOSIS — R53.81 DEBILITY: Primary | ICD-10-CM

## 2025-01-13 DIAGNOSIS — U07.1 COVID-19: ICD-10-CM

## 2025-01-13 DIAGNOSIS — I63.512 LEFT ACUTE ARTERIAL ISCHEMIC STROKE, MCA (MIDDLE CEREBRAL ARTERY)  (CMD): ICD-10-CM

## 2025-01-13 DIAGNOSIS — I10 HYPERTENSION, UNSPECIFIED TYPE: ICD-10-CM

## 2025-01-13 DIAGNOSIS — R68.89 EXCESSIVE ORAL SECRETIONS: ICD-10-CM

## 2025-01-13 DIAGNOSIS — G81.94 LEFT HEMIPARESIS  (CMD): ICD-10-CM

## 2025-01-13 DIAGNOSIS — E78.5 HYPERLIPIDEMIA LDL GOAL <70: ICD-10-CM

## 2025-01-13 DIAGNOSIS — R47.01 EXPRESSIVE APHASIA: ICD-10-CM

## 2025-01-13 DIAGNOSIS — Z93.1 GASTROSTOMY TUBE IN PLACE  (CMD): ICD-10-CM

## 2025-01-13 DIAGNOSIS — K21.9 GASTROESOPHAGEAL REFLUX DISEASE WITHOUT ESOPHAGITIS: ICD-10-CM

## 2025-01-13 DIAGNOSIS — K59.00 CONSTIPATION, UNSPECIFIED CONSTIPATION TYPE: ICD-10-CM

## 2025-01-13 DIAGNOSIS — Z86.73 HISTORY OF CARDIOEMBOLIC CEREBROVASCULAR ACCIDENT (CVA): ICD-10-CM

## 2025-01-13 ASSESSMENT — PAIN SCALES - GENERAL: PAINLEVEL: 0

## 2025-01-13 ASSESSMENT — ENCOUNTER SYMPTOMS
ACTIVITY CHANGE: 1
CONSTIPATION: 1

## 2025-01-14 ENCOUNTER — NURSING HOME VISIT (OUTPATIENT)
Dept: POST ACUTE CARE | Age: 55
End: 2025-01-14

## 2025-01-14 VITALS
OXYGEN SATURATION: 99 % | SYSTOLIC BLOOD PRESSURE: 119 MMHG | HEART RATE: 105 BPM | TEMPERATURE: 98.4 F | RESPIRATION RATE: 20 BRPM | DIASTOLIC BLOOD PRESSURE: 75 MMHG

## 2025-01-14 DIAGNOSIS — Z93.1 GASTROSTOMY TUBE IN PLACE  (CMD): ICD-10-CM

## 2025-01-14 DIAGNOSIS — Z86.73 HISTORY OF CARDIOEMBOLIC CEREBROVASCULAR ACCIDENT (CVA): ICD-10-CM

## 2025-01-14 DIAGNOSIS — K21.9 GASTROESOPHAGEAL REFLUX DISEASE WITHOUT ESOPHAGITIS: ICD-10-CM

## 2025-01-14 DIAGNOSIS — I63.512 LEFT ACUTE ARTERIAL ISCHEMIC STROKE, MCA (MIDDLE CEREBRAL ARTERY)  (CMD): Primary | ICD-10-CM

## 2025-01-14 DIAGNOSIS — G81.94 LEFT HEMIPARESIS  (CMD): ICD-10-CM

## 2025-01-14 DIAGNOSIS — D68.52 PROTHROMBIN GENE MUTATION  (CMD): ICD-10-CM

## 2025-01-14 DIAGNOSIS — R53.81 DEBILITY: ICD-10-CM

## 2025-01-14 DIAGNOSIS — I10 HYPERTENSION, UNSPECIFIED TYPE: ICD-10-CM

## 2025-01-14 DIAGNOSIS — I69.391 DYSPHAGIA AS LATE EFFECT OF STROKE: ICD-10-CM

## 2025-01-14 DIAGNOSIS — R47.01 EXPRESSIVE APHASIA: ICD-10-CM

## 2025-01-14 ASSESSMENT — ENCOUNTER SYMPTOMS
BACK PAIN: 0
VOMITING: 0
COUGH: 0
WEAKNESS: 1
DIZZINESS: 0
NERVOUS/ANXIOUS: 0
SINUS PRESSURE: 0
WHEEZING: 0
CHILLS: 0
FEVER: 0
NAUSEA: 0
AGITATION: 0
PHOTOPHOBIA: 0
ROS GI COMMENTS: G TUBE
SINUS PAIN: 0
DIARRHEA: 0
ACTIVITY CHANGE: 1
RHINORRHEA: 0
COLOR CHANGE: 0
POLYPHAGIA: 0
SORE THROAT: 0
LIGHT-HEADEDNESS: 0
TROUBLE SWALLOWING: 1
EYE REDNESS: 0
HEADACHES: 0
CONSTIPATION: 0
SHORTNESS OF BREATH: 0
POLYDIPSIA: 0
SLEEP DISTURBANCE: 0

## 2025-01-15 ENCOUNTER — NURSING HOME VISIT (OUTPATIENT)
Dept: POST ACUTE CARE | Age: 55
End: 2025-01-15

## 2025-01-15 VITALS
DIASTOLIC BLOOD PRESSURE: 74 MMHG | HEART RATE: 98 BPM | OXYGEN SATURATION: 97 % | RESPIRATION RATE: 18 BRPM | HEIGHT: 76 IN | SYSTOLIC BLOOD PRESSURE: 122 MMHG | BODY MASS INDEX: 24.65 KG/M2 | WEIGHT: 202.4 LBS | TEMPERATURE: 97.8 F

## 2025-01-15 DIAGNOSIS — I63.512 LEFT ACUTE ARTERIAL ISCHEMIC STROKE, MCA (MIDDLE CEREBRAL ARTERY)  (CMD): ICD-10-CM

## 2025-01-15 DIAGNOSIS — Z93.1 GASTROSTOMY TUBE IN PLACE  (CMD): ICD-10-CM

## 2025-01-15 DIAGNOSIS — K21.9 GASTROESOPHAGEAL REFLUX DISEASE WITHOUT ESOPHAGITIS: ICD-10-CM

## 2025-01-15 DIAGNOSIS — R53.81 DEBILITY: Primary | ICD-10-CM

## 2025-01-15 DIAGNOSIS — U07.1 COVID-19: ICD-10-CM

## 2025-01-15 DIAGNOSIS — E78.5 HYPERLIPIDEMIA LDL GOAL <70: ICD-10-CM

## 2025-01-15 DIAGNOSIS — Z86.73 HISTORY OF CARDIOEMBOLIC CEREBROVASCULAR ACCIDENT (CVA): ICD-10-CM

## 2025-01-15 DIAGNOSIS — K59.00 CONSTIPATION, UNSPECIFIED CONSTIPATION TYPE: ICD-10-CM

## 2025-01-15 DIAGNOSIS — G81.94 LEFT HEMIPARESIS  (CMD): ICD-10-CM

## 2025-01-15 DIAGNOSIS — I69.391 DYSPHAGIA AS LATE EFFECT OF STROKE: ICD-10-CM

## 2025-01-15 DIAGNOSIS — I10 HYPERTENSION, UNSPECIFIED TYPE: ICD-10-CM

## 2025-01-15 DIAGNOSIS — R47.01 EXPRESSIVE APHASIA: ICD-10-CM

## 2025-01-15 DIAGNOSIS — F41.9 ANXIETY: ICD-10-CM

## 2025-01-15 ASSESSMENT — ENCOUNTER SYMPTOMS
CONSTIPATION: 1
ACTIVITY CHANGE: 1

## 2025-01-15 ASSESSMENT — PAIN SCALES - GENERAL: PAINLEVEL: 0

## 2025-01-17 ENCOUNTER — NURSING HOME VISIT (OUTPATIENT)
Dept: POST ACUTE CARE | Age: 55
End: 2025-01-17

## 2025-01-17 VITALS
BODY MASS INDEX: 24.65 KG/M2 | WEIGHT: 202.4 LBS | RESPIRATION RATE: 18 BRPM | HEART RATE: 65 BPM | TEMPERATURE: 98.2 F | OXYGEN SATURATION: 94 % | DIASTOLIC BLOOD PRESSURE: 74 MMHG | HEIGHT: 76 IN | SYSTOLIC BLOOD PRESSURE: 113 MMHG

## 2025-01-17 DIAGNOSIS — R53.81 DEBILITY: Primary | ICD-10-CM

## 2025-01-17 DIAGNOSIS — E78.5 HYPERLIPIDEMIA LDL GOAL <70: ICD-10-CM

## 2025-01-17 DIAGNOSIS — I63.512 LEFT ACUTE ARTERIAL ISCHEMIC STROKE, MCA (MIDDLE CEREBRAL ARTERY)  (CMD): ICD-10-CM

## 2025-01-17 DIAGNOSIS — K59.00 CONSTIPATION, UNSPECIFIED CONSTIPATION TYPE: ICD-10-CM

## 2025-01-17 DIAGNOSIS — F41.9 ANXIETY: ICD-10-CM

## 2025-01-17 DIAGNOSIS — Z86.73 HISTORY OF CARDIOEMBOLIC CEREBROVASCULAR ACCIDENT (CVA): ICD-10-CM

## 2025-01-17 DIAGNOSIS — U07.1 COVID-19: ICD-10-CM

## 2025-01-17 DIAGNOSIS — R68.89 EXCESSIVE ORAL SECRETIONS: ICD-10-CM

## 2025-01-17 DIAGNOSIS — I10 HYPERTENSION, UNSPECIFIED TYPE: ICD-10-CM

## 2025-01-17 DIAGNOSIS — G81.94 LEFT HEMIPARESIS  (CMD): ICD-10-CM

## 2025-01-17 DIAGNOSIS — I69.391 DYSPHAGIA AS LATE EFFECT OF STROKE: ICD-10-CM

## 2025-01-17 DIAGNOSIS — K21.9 GASTROESOPHAGEAL REFLUX DISEASE WITHOUT ESOPHAGITIS: ICD-10-CM

## 2025-01-17 DIAGNOSIS — Z93.1 GASTROSTOMY TUBE IN PLACE  (CMD): ICD-10-CM

## 2025-01-17 DIAGNOSIS — R47.01 EXPRESSIVE APHASIA: ICD-10-CM

## 2025-01-17 ASSESSMENT — PAIN SCALES - GENERAL: PAINLEVEL: 0

## 2025-01-18 ASSESSMENT — ENCOUNTER SYMPTOMS
ACTIVITY CHANGE: 1
CONSTIPATION: 1

## 2025-01-22 ENCOUNTER — NURSING HOME VISIT (OUTPATIENT)
Dept: POST ACUTE CARE | Age: 55
End: 2025-01-22

## 2025-01-22 VITALS
WEIGHT: 204.6 LBS | HEART RATE: 96 BPM | OXYGEN SATURATION: 97 % | DIASTOLIC BLOOD PRESSURE: 75 MMHG | RESPIRATION RATE: 19 BRPM | BODY MASS INDEX: 24.9 KG/M2 | TEMPERATURE: 98.5 F | SYSTOLIC BLOOD PRESSURE: 135 MMHG

## 2025-01-22 DIAGNOSIS — Z93.1 GASTROSTOMY TUBE IN PLACE  (CMD): ICD-10-CM

## 2025-01-22 DIAGNOSIS — R47.01 EXPRESSIVE APHASIA: ICD-10-CM

## 2025-01-22 DIAGNOSIS — R53.81 DEBILITY: Primary | ICD-10-CM

## 2025-01-22 DIAGNOSIS — G81.94 LEFT HEMIPARESIS  (CMD): ICD-10-CM

## 2025-01-22 DIAGNOSIS — E78.5 HYPERLIPIDEMIA LDL GOAL <70: ICD-10-CM

## 2025-01-22 DIAGNOSIS — U07.1 COVID-19: ICD-10-CM

## 2025-01-22 DIAGNOSIS — I10 HYPERTENSION, UNSPECIFIED TYPE: ICD-10-CM

## 2025-01-22 DIAGNOSIS — Z86.73 HISTORY OF CARDIOEMBOLIC CEREBROVASCULAR ACCIDENT (CVA): ICD-10-CM

## 2025-01-22 DIAGNOSIS — K21.9 GASTROESOPHAGEAL REFLUX DISEASE WITHOUT ESOPHAGITIS: ICD-10-CM

## 2025-01-22 DIAGNOSIS — I63.512 LEFT ACUTE ARTERIAL ISCHEMIC STROKE, MCA (MIDDLE CEREBRAL ARTERY)  (CMD): ICD-10-CM

## 2025-01-22 DIAGNOSIS — F41.9 ANXIETY: ICD-10-CM

## 2025-01-22 ASSESSMENT — ENCOUNTER SYMPTOMS
CONSTIPATION: 1
ACTIVITY CHANGE: 1

## 2025-01-22 ASSESSMENT — PAIN SCALES - GENERAL: PAINLEVEL: 0

## 2025-01-23 ENCOUNTER — NURSING HOME VISIT (OUTPATIENT)
Dept: POST ACUTE CARE | Age: 55
End: 2025-01-23

## 2025-01-23 VITALS
RESPIRATION RATE: 16 BRPM | SYSTOLIC BLOOD PRESSURE: 119 MMHG | HEART RATE: 90 BPM | DIASTOLIC BLOOD PRESSURE: 78 MMHG | OXYGEN SATURATION: 95 % | TEMPERATURE: 98 F

## 2025-01-23 DIAGNOSIS — I63.512 LEFT ACUTE ARTERIAL ISCHEMIC STROKE, MCA (MIDDLE CEREBRAL ARTERY)  (CMD): Primary | ICD-10-CM

## 2025-01-23 DIAGNOSIS — G81.94 LEFT HEMIPARESIS  (CMD): ICD-10-CM

## 2025-01-23 DIAGNOSIS — Z93.1 GASTROSTOMY TUBE IN PLACE  (CMD): ICD-10-CM

## 2025-01-23 DIAGNOSIS — R53.81 DEBILITY: ICD-10-CM

## 2025-01-23 DIAGNOSIS — D68.52 PROTHROMBIN GENE MUTATION  (CMD): ICD-10-CM

## 2025-01-23 DIAGNOSIS — R68.89 EXCESSIVE ORAL SECRETIONS: ICD-10-CM

## 2025-01-23 DIAGNOSIS — I10 HYPERTENSION, UNSPECIFIED TYPE: ICD-10-CM

## 2025-01-23 DIAGNOSIS — I69.391 DYSPHAGIA AS LATE EFFECT OF STROKE: ICD-10-CM

## 2025-01-23 DIAGNOSIS — K21.9 GASTROESOPHAGEAL REFLUX DISEASE WITHOUT ESOPHAGITIS: ICD-10-CM

## 2025-01-23 PROCEDURE — 99310 SBSQ NF CARE HIGH MDM 45: CPT | Performed by: FAMILY MEDICINE

## 2025-01-23 ASSESSMENT — ENCOUNTER SYMPTOMS
ACTIVITY CHANGE: 1
COUGH: 0
CONSTIPATION: 0
PHOTOPHOBIA: 0
POLYDIPSIA: 0
AGITATION: 0
TROUBLE SWALLOWING: 1
SORE THROAT: 0
NERVOUS/ANXIOUS: 0
CHILLS: 0
SLEEP DISTURBANCE: 0
DIZZINESS: 0
RHINORRHEA: 0
POLYPHAGIA: 0
SINUS PRESSURE: 0
WEAKNESS: 1
SHORTNESS OF BREATH: 0
ROS GI COMMENTS: G TUBE
WHEEZING: 0
LIGHT-HEADEDNESS: 0
EYE REDNESS: 0
SINUS PAIN: 0
DIARRHEA: 0
HEADACHES: 0
VOMITING: 0
NAUSEA: 0
FEVER: 0
BACK PAIN: 0
COLOR CHANGE: 0

## 2025-01-27 ENCOUNTER — NURSING HOME VISIT (OUTPATIENT)
Dept: POST ACUTE CARE | Age: 55
End: 2025-01-27

## 2025-01-27 VITALS
HEIGHT: 76 IN | SYSTOLIC BLOOD PRESSURE: 107 MMHG | RESPIRATION RATE: 16 BRPM | OXYGEN SATURATION: 97 % | WEIGHT: 204.6 LBS | TEMPERATURE: 97.6 F | BODY MASS INDEX: 24.91 KG/M2 | DIASTOLIC BLOOD PRESSURE: 70 MMHG | HEART RATE: 85 BPM

## 2025-01-27 DIAGNOSIS — Z86.73 HISTORY OF CARDIOEMBOLIC CEREBROVASCULAR ACCIDENT (CVA): ICD-10-CM

## 2025-01-27 DIAGNOSIS — F41.9 ANXIETY: ICD-10-CM

## 2025-01-27 DIAGNOSIS — E78.5 HYPERLIPIDEMIA LDL GOAL <70: ICD-10-CM

## 2025-01-27 DIAGNOSIS — R53.81 DEBILITY: Primary | ICD-10-CM

## 2025-01-27 DIAGNOSIS — G81.94 LEFT HEMIPARESIS  (CMD): ICD-10-CM

## 2025-01-27 DIAGNOSIS — R47.01 EXPRESSIVE APHASIA: ICD-10-CM

## 2025-01-27 DIAGNOSIS — I63.512 LEFT ACUTE ARTERIAL ISCHEMIC STROKE, MCA (MIDDLE CEREBRAL ARTERY)  (CMD): ICD-10-CM

## 2025-01-27 DIAGNOSIS — R09.89 ABNORMAL CHEST SOUNDS: ICD-10-CM

## 2025-01-27 DIAGNOSIS — I69.391 DYSPHAGIA AS LATE EFFECT OF STROKE: ICD-10-CM

## 2025-01-27 DIAGNOSIS — K21.9 GASTROESOPHAGEAL REFLUX DISEASE WITHOUT ESOPHAGITIS: ICD-10-CM

## 2025-01-27 DIAGNOSIS — Z93.1 GASTROSTOMY TUBE IN PLACE  (CMD): ICD-10-CM

## 2025-01-27 DIAGNOSIS — I10 HYPERTENSION, UNSPECIFIED TYPE: ICD-10-CM

## 2025-01-27 ASSESSMENT — PAIN SCALES - GENERAL: PAINLEVEL: 3

## 2025-01-27 ASSESSMENT — ENCOUNTER SYMPTOMS
CONSTIPATION: 1
ACTIVITY CHANGE: 1

## 2025-01-28 ENCOUNTER — NURSING HOME VISIT (OUTPATIENT)
Dept: POST ACUTE CARE | Age: 55
End: 2025-01-28

## 2025-01-28 VITALS
HEART RATE: 86 BPM | DIASTOLIC BLOOD PRESSURE: 66 MMHG | SYSTOLIC BLOOD PRESSURE: 125 MMHG | OXYGEN SATURATION: 94 % | RESPIRATION RATE: 18 BRPM | TEMPERATURE: 98.2 F

## 2025-01-28 DIAGNOSIS — I69.391 DYSPHAGIA AS LATE EFFECT OF STROKE: ICD-10-CM

## 2025-01-28 DIAGNOSIS — M27.2 HARD PALATE ABSCESS: ICD-10-CM

## 2025-01-28 DIAGNOSIS — R53.81 DEBILITY: ICD-10-CM

## 2025-01-28 DIAGNOSIS — Z93.1 GASTROSTOMY TUBE IN PLACE  (CMD): ICD-10-CM

## 2025-01-28 DIAGNOSIS — G81.94 LEFT HEMIPARESIS  (CMD): ICD-10-CM

## 2025-01-28 DIAGNOSIS — K13.79 RECURRENT ORAL ULCERS: ICD-10-CM

## 2025-01-28 DIAGNOSIS — D68.52 PROTHROMBIN GENE MUTATION  (CMD): ICD-10-CM

## 2025-01-28 DIAGNOSIS — K21.9 GASTROESOPHAGEAL REFLUX DISEASE WITHOUT ESOPHAGITIS: ICD-10-CM

## 2025-01-28 DIAGNOSIS — I63.319 CEREBROVASCULAR ACCIDENT (CVA) DUE TO THROMBOSIS OF MIDDLE CEREBRAL ARTERY, UNSPECIFIED BLOOD VESSEL LATERALITY  (CMD): Primary | ICD-10-CM

## 2025-01-28 DIAGNOSIS — R47.01 EXPRESSIVE APHASIA: ICD-10-CM

## 2025-01-28 DIAGNOSIS — R68.89 EXCESSIVE ORAL SECRETIONS: ICD-10-CM

## 2025-01-28 DIAGNOSIS — I10 HYPERTENSION, UNSPECIFIED TYPE: ICD-10-CM

## 2025-01-28 PROCEDURE — 99310 SBSQ NF CARE HIGH MDM 45: CPT | Performed by: FAMILY MEDICINE

## 2025-01-28 ASSESSMENT — ENCOUNTER SYMPTOMS
WEAKNESS: 1
POLYPHAGIA: 0
SHORTNESS OF BREATH: 0
PHOTOPHOBIA: 0
SORE THROAT: 0
ACTIVITY CHANGE: 1
RHINORRHEA: 0
CONSTIPATION: 0
EYE REDNESS: 0
LIGHT-HEADEDNESS: 0
AGITATION: 0
NERVOUS/ANXIOUS: 0
FEVER: 0
DIARRHEA: 0
BACK PAIN: 0
ROS GI COMMENTS: G TUBE
WHEEZING: 0
COLOR CHANGE: 0
DIZZINESS: 0
HEADACHES: 0
COUGH: 0
NAUSEA: 0
SINUS PAIN: 0
SLEEP DISTURBANCE: 0
CHILLS: 0
POLYDIPSIA: 0
TROUBLE SWALLOWING: 1
VOMITING: 0
SINUS PRESSURE: 0

## 2025-01-29 ENCOUNTER — NURSING HOME VISIT (OUTPATIENT)
Dept: POST ACUTE CARE | Age: 55
End: 2025-01-29

## 2025-01-29 VITALS
SYSTOLIC BLOOD PRESSURE: 116 MMHG | HEART RATE: 62 BPM | TEMPERATURE: 97.7 F | DIASTOLIC BLOOD PRESSURE: 72 MMHG | OXYGEN SATURATION: 97 % | RESPIRATION RATE: 18 BRPM

## 2025-01-29 DIAGNOSIS — R47.01 EXPRESSIVE APHASIA: ICD-10-CM

## 2025-01-29 DIAGNOSIS — K21.9 GASTROESOPHAGEAL REFLUX DISEASE WITHOUT ESOPHAGITIS: ICD-10-CM

## 2025-01-29 DIAGNOSIS — Z86.73 HISTORY OF CARDIOEMBOLIC CEREBROVASCULAR ACCIDENT (CVA): ICD-10-CM

## 2025-01-29 DIAGNOSIS — E78.5 HYPERLIPIDEMIA LDL GOAL <70: ICD-10-CM

## 2025-01-29 DIAGNOSIS — R53.81 DEBILITY: Primary | ICD-10-CM

## 2025-01-29 DIAGNOSIS — R68.89 EXCESSIVE ORAL SECRETIONS: ICD-10-CM

## 2025-01-29 DIAGNOSIS — G81.94 LEFT HEMIPARESIS  (CMD): ICD-10-CM

## 2025-01-29 DIAGNOSIS — K12.2 ABSCESS OF MOUTH: ICD-10-CM

## 2025-01-29 DIAGNOSIS — I10 HYPERTENSION, UNSPECIFIED TYPE: ICD-10-CM

## 2025-01-29 DIAGNOSIS — Z93.1 GASTROSTOMY TUBE IN PLACE  (CMD): ICD-10-CM

## 2025-01-29 DIAGNOSIS — I63.512 LEFT ACUTE ARTERIAL ISCHEMIC STROKE, MCA (MIDDLE CEREBRAL ARTERY)  (CMD): ICD-10-CM

## 2025-01-29 DIAGNOSIS — F41.9 ANXIETY: ICD-10-CM

## 2025-01-29 PROBLEM — K04.7 PERIAPICAL ABSCESS WITHOUT SINUS: Status: ACTIVE | Noted: 2025-01-29

## 2025-01-29 PROBLEM — K04.7 PERIAPICAL ABSCESS WITHOUT SINUS: Status: RESOLVED | Noted: 2025-01-29 | Resolved: 2025-01-29

## 2025-01-29 PROCEDURE — 99309 SBSQ NF CARE MODERATE MDM 30: CPT | Performed by: NURSE PRACTITIONER

## 2025-01-29 PROCEDURE — 1158F ADVNC CARE PLAN TLK DOCD: CPT | Performed by: NURSE PRACTITIONER

## 2025-01-29 PROCEDURE — 1157F ADVNC CARE PLAN IN RCRD: CPT | Performed by: NURSE PRACTITIONER

## 2025-01-29 ASSESSMENT — PAIN SCALES - GENERAL: PAINLEVEL: 0

## 2025-01-29 ASSESSMENT — ENCOUNTER SYMPTOMS
ACTIVITY CHANGE: 1
CONSTIPATION: 1

## 2025-01-31 ENCOUNTER — NURSING HOME VISIT (OUTPATIENT)
Dept: POST ACUTE CARE | Age: 55
End: 2025-01-31

## 2025-01-31 VITALS
OXYGEN SATURATION: 98 % | HEIGHT: 76 IN | TEMPERATURE: 97.8 F | DIASTOLIC BLOOD PRESSURE: 64 MMHG | HEART RATE: 89 BPM | BODY MASS INDEX: 24.91 KG/M2 | RESPIRATION RATE: 18 BRPM | WEIGHT: 204.6 LBS | SYSTOLIC BLOOD PRESSURE: 107 MMHG

## 2025-01-31 DIAGNOSIS — E78.5 HYPERLIPIDEMIA LDL GOAL <70: ICD-10-CM

## 2025-01-31 DIAGNOSIS — R47.01 EXPRESSIVE APHASIA: ICD-10-CM

## 2025-01-31 DIAGNOSIS — R53.81 DEBILITY: Primary | ICD-10-CM

## 2025-01-31 DIAGNOSIS — K12.2 ABSCESS OF MOUTH: ICD-10-CM

## 2025-01-31 DIAGNOSIS — I63.512 LEFT ACUTE ARTERIAL ISCHEMIC STROKE, MCA (MIDDLE CEREBRAL ARTERY)  (CMD): ICD-10-CM

## 2025-01-31 DIAGNOSIS — Z86.73 HISTORY OF CARDIOEMBOLIC CEREBROVASCULAR ACCIDENT (CVA): ICD-10-CM

## 2025-01-31 DIAGNOSIS — G81.94 LEFT HEMIPARESIS  (CMD): ICD-10-CM

## 2025-01-31 DIAGNOSIS — I69.391 DYSPHAGIA AS LATE EFFECT OF STROKE: ICD-10-CM

## 2025-01-31 DIAGNOSIS — K21.9 GASTROESOPHAGEAL REFLUX DISEASE WITHOUT ESOPHAGITIS: ICD-10-CM

## 2025-01-31 DIAGNOSIS — Z93.1 GASTROSTOMY TUBE IN PLACE  (CMD): ICD-10-CM

## 2025-01-31 DIAGNOSIS — F41.9 ANXIETY: ICD-10-CM

## 2025-01-31 DIAGNOSIS — R68.89 EXCESSIVE ORAL SECRETIONS: ICD-10-CM

## 2025-01-31 DIAGNOSIS — I63.319 CEREBROVASCULAR ACCIDENT (CVA) DUE TO THROMBOSIS OF MIDDLE CEREBRAL ARTERY, UNSPECIFIED BLOOD VESSEL LATERALITY  (CMD): ICD-10-CM

## 2025-01-31 DIAGNOSIS — I10 HYPERTENSION, UNSPECIFIED TYPE: ICD-10-CM

## 2025-01-31 ASSESSMENT — ENCOUNTER SYMPTOMS
CONSTIPATION: 1
ACTIVITY CHANGE: 1

## 2025-01-31 ASSESSMENT — PAIN SCALES - GENERAL: PAINLEVEL: 0

## 2025-02-03 ENCOUNTER — NURSING HOME VISIT (OUTPATIENT)
Dept: POST ACUTE CARE | Age: 55
End: 2025-02-03

## 2025-02-03 VITALS
HEART RATE: 68 BPM | WEIGHT: 202.3 LBS | RESPIRATION RATE: 18 BRPM | DIASTOLIC BLOOD PRESSURE: 64 MMHG | BODY MASS INDEX: 24.63 KG/M2 | OXYGEN SATURATION: 96 % | SYSTOLIC BLOOD PRESSURE: 112 MMHG | HEIGHT: 76 IN | TEMPERATURE: 97.6 F

## 2025-02-03 DIAGNOSIS — I63.512 LEFT ACUTE ARTERIAL ISCHEMIC STROKE, MCA (MIDDLE CEREBRAL ARTERY)  (CMD): ICD-10-CM

## 2025-02-03 DIAGNOSIS — Z93.1 GASTROSTOMY TUBE IN PLACE  (CMD): ICD-10-CM

## 2025-02-03 DIAGNOSIS — K21.9 GASTROESOPHAGEAL REFLUX DISEASE WITHOUT ESOPHAGITIS: ICD-10-CM

## 2025-02-03 DIAGNOSIS — F41.9 ANXIETY: ICD-10-CM

## 2025-02-03 DIAGNOSIS — Z86.73 HISTORY OF CARDIOEMBOLIC CEREBROVASCULAR ACCIDENT (CVA): ICD-10-CM

## 2025-02-03 DIAGNOSIS — R68.89 EXCESSIVE ORAL SECRETIONS: ICD-10-CM

## 2025-02-03 DIAGNOSIS — I69.391 DYSPHAGIA AS LATE EFFECT OF STROKE: ICD-10-CM

## 2025-02-03 DIAGNOSIS — I10 HYPERTENSION, UNSPECIFIED TYPE: ICD-10-CM

## 2025-02-03 DIAGNOSIS — R53.81 DEBILITY: Primary | ICD-10-CM

## 2025-02-03 DIAGNOSIS — K12.2 ABSCESS OF MOUTH: ICD-10-CM

## 2025-02-03 DIAGNOSIS — E78.5 HYPERLIPIDEMIA LDL GOAL <70: ICD-10-CM

## 2025-02-03 DIAGNOSIS — I63.319 CEREBROVASCULAR ACCIDENT (CVA) DUE TO THROMBOSIS OF MIDDLE CEREBRAL ARTERY, UNSPECIFIED BLOOD VESSEL LATERALITY  (CMD): ICD-10-CM

## 2025-02-03 DIAGNOSIS — G81.94 LEFT HEMIPARESIS  (CMD): ICD-10-CM

## 2025-02-03 DIAGNOSIS — R47.01 EXPRESSIVE APHASIA: ICD-10-CM

## 2025-02-03 ASSESSMENT — ENCOUNTER SYMPTOMS
CONSTIPATION: 1
ACTIVITY CHANGE: 1

## 2025-02-03 ASSESSMENT — PAIN SCALES - GENERAL: PAINLEVEL: 0

## 2025-02-04 ENCOUNTER — NURSING HOME VISIT (OUTPATIENT)
Dept: POST ACUTE CARE | Age: 55
End: 2025-02-04

## 2025-02-04 VITALS
DIASTOLIC BLOOD PRESSURE: 74 MMHG | RESPIRATION RATE: 16 BRPM | SYSTOLIC BLOOD PRESSURE: 110 MMHG | OXYGEN SATURATION: 97 % | HEART RATE: 90 BPM | TEMPERATURE: 98 F

## 2025-02-04 DIAGNOSIS — I10 HYPERTENSION, UNSPECIFIED TYPE: ICD-10-CM

## 2025-02-04 DIAGNOSIS — R47.01 EXPRESSIVE APHASIA: ICD-10-CM

## 2025-02-04 DIAGNOSIS — Z93.1 GASTROSTOMY TUBE IN PLACE  (CMD): ICD-10-CM

## 2025-02-04 DIAGNOSIS — R53.81 DEBILITY: ICD-10-CM

## 2025-02-04 DIAGNOSIS — D68.52 PROTHROMBIN GENE MUTATION  (CMD): ICD-10-CM

## 2025-02-04 DIAGNOSIS — R79.89 ELEVATED LFTS: ICD-10-CM

## 2025-02-04 DIAGNOSIS — R68.89 EXCESSIVE ORAL SECRETIONS: ICD-10-CM

## 2025-02-04 DIAGNOSIS — I63.319 CEREBROVASCULAR ACCIDENT (CVA) DUE TO THROMBOSIS OF MIDDLE CEREBRAL ARTERY, UNSPECIFIED BLOOD VESSEL LATERALITY  (CMD): Primary | ICD-10-CM

## 2025-02-04 DIAGNOSIS — K21.9 GASTROESOPHAGEAL REFLUX DISEASE WITHOUT ESOPHAGITIS: ICD-10-CM

## 2025-02-04 DIAGNOSIS — G81.94 LEFT HEMIPARESIS  (CMD): ICD-10-CM

## 2025-02-04 DIAGNOSIS — I69.391 DYSPHAGIA AS LATE EFFECT OF STROKE: ICD-10-CM

## 2025-02-04 PROCEDURE — 99310 SBSQ NF CARE HIGH MDM 45: CPT | Performed by: FAMILY MEDICINE

## 2025-02-04 ASSESSMENT — ENCOUNTER SYMPTOMS
CHILLS: 0
FEVER: 0
DIARRHEA: 0
COLOR CHANGE: 0
PHOTOPHOBIA: 0
RHINORRHEA: 0
ACTIVITY CHANGE: 1
NERVOUS/ANXIOUS: 0
WHEEZING: 0
ROS GI COMMENTS: G TUBE
SORE THROAT: 0
SINUS PRESSURE: 0
POLYPHAGIA: 0
SLEEP DISTURBANCE: 0
DIZZINESS: 0
COUGH: 0
BACK PAIN: 0
VOMITING: 0
EYE REDNESS: 0
POLYDIPSIA: 0
HEADACHES: 0
SINUS PAIN: 0
LIGHT-HEADEDNESS: 0
AGITATION: 0
NAUSEA: 0
WEAKNESS: 1
CONSTIPATION: 0
TROUBLE SWALLOWING: 1
SHORTNESS OF BREATH: 0

## 2025-02-10 ENCOUNTER — NURSING HOME VISIT (OUTPATIENT)
Dept: FAMILY MEDICINE | Age: 55
End: 2025-02-10

## 2025-02-10 DIAGNOSIS — Z93.1 GASTROSTOMY TUBE IN PLACE  (CMD): ICD-10-CM

## 2025-02-10 DIAGNOSIS — I63.319 CEREBROVASCULAR ACCIDENT (CVA) DUE TO THROMBOSIS OF MIDDLE CEREBRAL ARTERY, UNSPECIFIED BLOOD VESSEL LATERALITY  (CMD): Primary | ICD-10-CM

## 2025-02-10 DIAGNOSIS — I10 HYPERTENSION, UNSPECIFIED TYPE: ICD-10-CM

## 2025-02-10 DIAGNOSIS — K12.2 ABSCESS OF MOUTH: ICD-10-CM

## 2025-02-10 DIAGNOSIS — R47.01 EXPRESSIVE APHASIA: ICD-10-CM

## 2025-02-10 DIAGNOSIS — G81.94 LEFT HEMIPARESIS  (CMD): ICD-10-CM

## 2025-02-10 DIAGNOSIS — I69.391 DYSPHAGIA AS LATE EFFECT OF STROKE: ICD-10-CM

## 2025-02-10 DIAGNOSIS — R53.81 DEBILITY: ICD-10-CM

## 2025-03-09 ENCOUNTER — NURSING HOME VISIT (OUTPATIENT)
Dept: FAMILY MEDICINE | Age: 55
End: 2025-03-09

## 2025-03-09 DIAGNOSIS — G81.94 LEFT HEMIPARESIS  (CMD): Primary | ICD-10-CM

## 2025-03-09 DIAGNOSIS — F41.9 ANXIETY: ICD-10-CM

## 2025-03-09 DIAGNOSIS — R68.89 EXCESSIVE ORAL SECRETIONS: ICD-10-CM

## 2025-03-09 DIAGNOSIS — Z93.1 GASTROSTOMY TUBE IN PLACE (CMD): ICD-10-CM

## 2025-03-09 DIAGNOSIS — Z86.73 HISTORY OF CARDIOEMBOLIC CEREBROVASCULAR ACCIDENT (CVA): ICD-10-CM

## 2025-03-09 DIAGNOSIS — R53.81 DEBILITY: ICD-10-CM

## 2025-03-09 DIAGNOSIS — I69.391 DYSPHAGIA AS LATE EFFECT OF STROKE: ICD-10-CM

## 2025-03-09 DIAGNOSIS — R47.01 EXPRESSIVE APHASIA: ICD-10-CM

## 2025-03-09 PROCEDURE — 99310 SBSQ NF CARE HIGH MDM 45: CPT | Performed by: FAMILY MEDICINE

## 2025-03-28 ENCOUNTER — TELEPHONE (OUTPATIENT)
Dept: FAMILY MEDICINE | Age: 55
End: 2025-03-28

## 2025-05-29 ENCOUNTER — TELEPHONE (OUTPATIENT)
Dept: FAMILY MEDICINE | Age: 55
End: 2025-05-29

## 2025-07-08 ENCOUNTER — TELEPHONE (OUTPATIENT)
Dept: FAMILY MEDICINE | Age: 55
End: 2025-07-08

## 2025-07-17 ENCOUNTER — TELEPHONE (OUTPATIENT)
Dept: FAMILY MEDICINE | Age: 55
End: 2025-07-17

## (undated) NOTE — IP AVS SNAPSHOT
[facility] [place] [address] [place], [address] ~ [phone]                Discharge Summary   6/21/2017    Liang Blackman           Admission Information        Provider Department    6/21/2017 Ami Lyons DO E 2. Follow up in the office as ordered  3. Continue on going management of stroke risk factors for stroke, including importance of balanced mediterranean diet, as well as importance of lipid control, smoking cessation and alcohol consumption abstinence. [facility]   at [phone]. Questions:      Physician to read?:      Is this a stat exam?:        Immunization History as of 6/23/2017  Never Reviewed    No immunizations on file.       Recent Hematology Lab Results  (Last 3 res - If you are a smoker or have smoked in the last 12 months, we encourage you to explore options for quitting.     - If you have concerns related to behavioral health issues or thoughts of harming yourself, contact [facility] a provide you with additional printed information. Not all patients will experience these side effects or respond to medications the same. Please call your provider or healthcare team if you have any questions regarding your medications while at home.